# Patient Record
Sex: FEMALE | Race: WHITE | Employment: FULL TIME | URBAN - METROPOLITAN AREA
[De-identification: names, ages, dates, MRNs, and addresses within clinical notes are randomized per-mention and may not be internally consistent; named-entity substitution may affect disease eponyms.]

---

## 2018-09-10 ENCOUNTER — HOSPITAL ENCOUNTER (OUTPATIENT)
Dept: MAMMOGRAPHY | Age: 43
Discharge: HOME | End: 2018-09-10
Attending: FAMILY MEDICINE
Payer: COMMERCIAL

## 2018-09-10 DIAGNOSIS — R92.8 ABNORMAL MAMMOGRAM OF LEFT BREAST: ICD-10-CM

## 2018-09-10 DIAGNOSIS — Z12.39 SCREENING BREAST EXAMINATION: ICD-10-CM

## 2018-09-10 PROCEDURE — 77063 BREAST TOMOSYNTHESIS BI: CPT

## 2018-09-10 PROCEDURE — 76642 ULTRASOUND BREAST LIMITED: CPT

## 2018-10-08 ENCOUNTER — OFFICE VISIT (OUTPATIENT)
Dept: SURGERY | Age: 43
End: 2018-10-08

## 2018-10-08 VITALS
DIASTOLIC BLOOD PRESSURE: 84 MMHG | BODY MASS INDEX: 29.59 KG/M2 | HEIGHT: 63 IN | WEIGHT: 167 LBS | SYSTOLIC BLOOD PRESSURE: 137 MMHG | HEART RATE: 86 BPM

## 2018-10-08 DIAGNOSIS — N60.12 FIBROCYSTIC DISEASE OF LEFT BREAST: ICD-10-CM

## 2018-10-08 DIAGNOSIS — Z80.3 FAMILY HISTORY OF BREAST CANCER: Primary | ICD-10-CM

## 2018-10-08 NOTE — PATIENT INSTRUCTIONS
Breast Lumps: Care Instructions  Your Care Instructions  Breast lumps are common, especially in women between ages 27 and 48. Many women's breasts feel lumpy and tender before their menstrual period. Women also may have lumps when they are breastfeeding. Breast lumps may go away after menopause. All new breast lumps in women after menopause should be checked by a doctor. Although lumps may be normal for you, it is important to have your doctor check any lump or thickness that is not like the rest of your breast to make sure it is not cancer. A lump may be larger, harder, or different from the rest of your breast tissue. Follow-up care is a key part of your treatment and safety. Be sure to make and go to all appointments, and call your doctor if you are having problems. It's also a good idea to know your test results and keep a list of the medicines you take. How can you care for yourself at home? · Make an appointment to have a mammogram and other follow-up visits as recommended by your doctor. When should you call for help? Watch closely for changes in your health, and be sure to contact your doctor if:    · You do not get better as expected.     · Your breast has changed.     · You have pain in your breast.     · You have a discharge from your nipple.     · A breast lump changes or does not go away. Where can you learn more? Go to http://luis-yadiel.info/. Enter I474 in the search box to learn more about \"Breast Lumps: Care Instructions. \"  Current as of: May 15, 2018  Content Version: 11.8  © 9874-0811 Healthwise, Incorporated. Care instructions adapted under license by [employer] (which disclaims liability or warranty for this information). If you have questions about a medical condition or this instruction, always ask your healthcare professional. [address] any warranty or liability for your use of this information.

## 2018-10-08 NOTE — MR AVS SNAPSHOT
[address] [facility] G1 1400 [address] [place] [address] 
[phone] Patient: Alexandra Dacosta MRN: [id number] HAILE:8[birth year]3430 Visit Information Date & Time Provider Department Dept. Phone Encounter #  
 10/8/2018 10:00  MD [address] at [facility] [phone] Upcoming Health Maintenance Date Due Pneumococcal 19-64 Medium Risk (1 of 1 - PPSV23) 5/12/1993 DTaP/Tdap/Td series (1 - Tdap) 5/12/1995 PAP AKA CERVICAL CYTOLOGY 5/12/1995 Influenza Age 5 to Adult 8/1/2018 Allergies as of 10/8/2018  Review Complete On: 8/10/2015 By: Abebe Johnson RN Severity Noted Reaction Type Reactions Topamax [Topiramate]  03/25/2014    Vertigo Current Immunizations  Never Reviewed No immunizations on file. Not reviewed this visit Vitals BP Pulse Height(growth percentile) Weight(growth percentile) BMI OB Status 137/84 86 5' 3\" (1.6 m) 167 lb (75.8 kg) 29.58 kg/m2 Perimenopausal  
 Smoking Status Current Every Day Smoker BMI and BSA Data Body Mass Index Body Surface Area  
 29.58 kg/m 2 1.84 m 2 Your Updated Medication List  
  
   
This list is accurate as of 10/8/18 10:46 AM.  Always use your most recent med list. ADVIL PO Take  by mouth as needed. ATIVAN 0.5 mg tablet Generic drug:  LORazepam  
Take 0.25 mg by mouth every eight (8) hours as needed for Anxiety. synthroid 50 mcg tablet Generic drug:  levothyroxine Take 50 mcg by mouth Daily (before breakfast). ZOLOFT 25 mg tablet Generic drug:  sertraline Take 25 mg by mouth daily. Patient Instructions Breast Lumps: Care Instructions Your Care Instructions Breast lumps are common, especially in women between ages 27 and 48. Many women's breasts feel lumpy and tender before their menstrual period.  Women also may have lumps when they are breastfeeding. Breast lumps may go away after menopause. All new breast lumps in women after menopause should be checked by a doctor. Although lumps may be normal for you, it is important to have your doctor check any lump or thickness that is not like the rest of your breast to make sure it is not cancer. A lump may be larger, harder, or different from the rest of your breast tissue. Follow-up care is a key part of your treatment and safety. Be sure to make and go to all appointments, and call your doctor if you are having problems. It's also a good idea to know your test results and keep a list of the medicines you take. How can you care for yourself at home? · Make an appointment to have a mammogram and other follow-up visits as recommended by your doctor. When should you call for help? Watch closely for changes in your health, and be sure to contact your doctor if: 
  · You do not get better as expected.  
  · Your breast has changed.  
  · You have pain in your breast.  
  · You have a discharge from your nipple.  
  · A breast lump changes or does not go away. Where can you learn more? Go to http://luis-yadiel.info/. Enter R848 in the search box to learn more about \"Breast Lumps: Care Instructions. \" Current as of: May 15, 2018 Content Version: 11.8 © 6407-2205 Healthwise, Incorporated. Care instructions adapted under license by [employer] (which disclaims liability or warranty for this information). If you have questions about a medical condition or this instruction, always ask your healthcare professional. [place] [address] any warranty or liability for your use of this information. Introducing [facility]! Dear Odessa Reyna: Thank you for requesting a [employer] account. Our records indicate that you already have an active [employer] account.   You can access your account anytime at [email or web address] Did you know that you can access your hospital and ER discharge instructions at any time in [employer]? You can also review all of your test results from your hospital stay or ER visit. Additional Information If you have questions, please visit the Frequently Asked Questions section of the [employer] website at [email or web address] Remember, [employer] is NOT to be used for urgent needs. For medical emergencies, dial 911. Now available from your iPhone and Android! Please provide this summary of care documentation to your next provider. Your primary care clinician is listed as Bj Reece. If you have any questions after today's visit, please call [phone].

## 2018-10-08 NOTE — LETTER
10/10/2018 10:58 AM 
 
Patient:  Maryann Reyna YOB: 1975 Date of Visit: 10/8/2018 Dear Penny Flores MD 
[address] [place], [place] [address] [phone] [phone] VIA Facsimile: [phone] 
 : Thank you for referring Ms. Denia Campbell to me for evaluation/treatment. Below are the relevant portions of my assessment and plan of care. HISTORY OF PRESENT ILLNESS Maryann Reyna is a 39 y.o. female. HPI 
NEW patient here today referred for consultation at the request of Dr. Lisbet Benavides for LEFT breast lump and high risk. She has palpated painful lump in LEFT breast 7:00 [facility]. Lump continues to be painful. She had biopsy of this lump about 4 years ago, but was found to be a cyst and filled back up immediately. She also reports having RIGHT breast lumps, but she is unable to palpate. Denies any nipple discharge/retraction or skin changes. FH includes- 
Mother diagnosed with lung cancer, . Maternal grandmother diagnosed with uterine cancer, . Paternal grandmother diagnosed with breast cancer, . Recent imaging-  
3D bilateral screening mammogram on 9/10/18- BIRADS 0 LEFT breast ultrasound on 9/10/18- BIRADS 2 cysts in left breast 1:00 She gets breast MRIs due to her high risk. Previously calculated tyrer-melba is 21.5% Past Medical History:  
Diagnosis Date  Lupus  Musculoskeletal disorder  Thyroid disease Past Surgical History:  
Procedure Laterality Date  HX HEENT    
 jaw surgery  HX LAP CHOLECYSTECTOMY  2/24/15  
 by Dr. Miryam Yates  [facility] BX BREAST LT 1ST LESION W/CLIP AND SPECIMEN Left   
 benign Social History Social History  Marital status:  Spouse name: N/A  
 Number of children: N/A  
 Years of education: N/A Occupational History  Not on file. Social History Main Topics  Smoking status: Current Every Day Smoker  Smokeless tobacco: Current User  Alcohol use No  
 Drug use: Not on file  Sexual activity: Not on file Other Topics Concern  Not on file Social History Narrative OB History Obstetric Comments Menarche 5, LMP current, # of children 1, age of 4st delivery 27, Hysterectomy/oophorectomy no/no, Breast bx yes, left 4 years ago, history of breast feeding no, BCP yes, Hormone therapy no Current Outpatient Prescriptions:  
  sertraline (ZOLOFT) 25 mg tablet, Take 25 mg by mouth daily. , Disp: , Rfl:  
  levothyroxine (SYNTHROID) 50 mcg tablet, Take 50 mcg by mouth Daily (before breakfast). , Disp: , Rfl:  
  LORazepam (ATIVAN) 0.5 mg tablet, Take 0.25 mg by mouth every eight (8) hours as needed for Anxiety. , Disp: , Rfl:  
  IBUPROFEN (ADVIL PO), Take  by mouth as needed. , Disp: , Rfl:  
Allergies Allergen Reactions  Topamax [Topiramate] Vertigo Review of Systems Constitutional: Positive for malaise/fatigue. Eyes: Negative. Respiratory: Negative. Cardiovascular: Positive for chest pain. Gastrointestinal: Positive for abdominal pain and diarrhea. Genitourinary: Negative. Musculoskeletal: Positive for back pain and joint pain. Skin: Negative. Neurological: Positive for headaches. Endo/Heme/Allergies: Negative. Psychiatric/Behavioral: Negative. Physical Exam  
Constitutional: She is oriented to person, place, and time. She appears well-developed and well-nourished. HENT:  
Head: Normocephalic. Eyes: EOM are normal.  
Neck: Neck supple. Cardiovascular: Intact distal pulses. Pulmonary/Chest: Effort normal. Right breast exhibits no inverted nipple, no mass, no nipple discharge, no skin change and no tenderness. Left breast exhibits mass (mass at 7:00 at [facility]. cysts seen on us. ) and tenderness. Left breast exhibits no inverted nipple, no nipple discharge and no skin change. Breasts are symmetrical.  
Abdominal: Soft. Musculoskeletal: Normal range of motion. Lymphadenopathy:  
  She has no cervical adenopathy. She has no axillary adenopathy. Neurological: She is alert and oriented to person, place, and time. Skin: Skin is warm and dry. Psychiatric: She has a normal mood and affect. Nursing note and vitals reviewed. ASSESSMENT and PLAN 
  ICD-10-CM ICD-9-CM 1. Family history of breast cancer Z80.3 V16.3 2. Fibrocystic disease of left breast N60.12 610.1 38 yo female high risk with chronic left breast lump She has focal pain in this area and has had aspirations in the past.  
She wishes to have this removed. I think this is reasonable but needs mri first for high risk. She is fine with the plan. If you have questions, please do not hesitate to call me. I look forward to following Ms. Olivo along with you. Sincerely, Mak Duncan MD

## 2018-10-08 NOTE — PROGRESS NOTES
HISTORY OF PRESENT ILLNESS  Chaim Parker is a 39 y.o. female. HPI  NEW patient here today referred for consultation at the request of Dr. Anushka Avila for LEFT breast lump and high risk. She has palpated painful lump in LEFT breast 7:00 at [facility] for 10 years. Lump continues to be painful. She had biopsy of this lump about 4 years ago, but was found to be a cyst and filled back up immediately. She also reports having RIGHT breast lumps, but she is unable to palpate. Denies any nipple discharge/retraction or skin changes. FH includes-  Mother diagnosed with lung cancer, . Maternal grandmother diagnosed with uterine cancer, . Paternal grandmother diagnosed with breast cancer, . Recent imaging-   3D bilateral screening mammogram on 9/10/18- BIRADS 0  LEFT breast ultrasound on 9/10/18- BIRADS 2 cysts in left breast 1:00    She gets breast MRIs due to her high risk. Previously calculated tyrer-melba is 21.5%     Past Medical History:   Diagnosis Date    Lupus     Musculoskeletal disorder     Thyroid disease      Past Surgical History:   Procedure Laterality Date    HX HEENT      jaw surgery    HX LAP CHOLECYSTECTOMY  2/24/15    by Dr. Nina Reis Left     benign     Social History     Social History    Marital status:      Spouse name: N/A    Number of children: N/A    Years of education: N/A     Occupational History    Not on file.      Social History Main Topics    Smoking status: Current Every Day Smoker    Smokeless tobacco: Current User    Alcohol use No    Drug use: Not on file    Sexual activity: Not on file     Other Topics Concern    Not on file     Social History Narrative     OB History     Obstetric Comments    Menarche 5, LMP current, # of children 1, age of 4st delivery 27, Hysterectomy/oophorectomy no/no, Breast bx yes, left 4 years ago, history of breast feeding no, BCP yes, Hormone therapy no            Current Outpatient Prescriptions:     sertraline (ZOLOFT) 25 mg tablet, Take 25 mg by mouth daily. , Disp: , Rfl:     levothyroxine (SYNTHROID) 50 mcg tablet, Take 50 mcg by mouth Daily (before breakfast). , Disp: , Rfl:     LORazepam (ATIVAN) 0.5 mg tablet, Take 0.25 mg by mouth every eight (8) hours as needed for Anxiety. , Disp: , Rfl:     IBUPROFEN (ADVIL PO), Take  by mouth as needed. , Disp: , Rfl:   Allergies   Allergen Reactions    Topamax [Topiramate] Vertigo           Review of Systems   Constitutional: Positive for malaise/fatigue. Eyes: Negative. Respiratory: Negative. Cardiovascular: Positive for chest pain. Gastrointestinal: Positive for abdominal pain and diarrhea. Genitourinary: Negative. Musculoskeletal: Positive for back pain and joint pain. Skin: Negative. Neurological: Positive for headaches. Endo/Heme/Allergies: Negative. Psychiatric/Behavioral: Negative. Physical Exam   Constitutional: She is oriented to person, place, and time. She appears well-developed and well-nourished. HENT:   Head: Normocephalic. Eyes: EOM are normal.   Neck: Neck supple. Cardiovascular: Intact distal pulses. Pulmonary/Chest: Effort normal. Right breast exhibits no inverted nipple, no mass, no nipple discharge, no skin change and no tenderness. Left breast exhibits mass (mass at 7:00 at [facility]. cysts seen on us. ) and tenderness. Left breast exhibits no inverted nipple, no nipple discharge and no skin change. Breasts are symmetrical.   Abdominal: Soft. Musculoskeletal: Normal range of motion. Lymphadenopathy:     She has no cervical adenopathy. She has no axillary adenopathy. Neurological: She is alert and oriented to person, place, and time. Skin: Skin is warm and dry. Psychiatric: She has a normal mood and affect. Nursing note and vitals reviewed. ASSESSMENT and PLAN    ICD-10-CM ICD-9-CM    1. Family history of breast cancer Z80.3 V16.3    2. Fibrocystic disease of left breast N60.12 610.1      40 yo female high risk with chronic left breast lump  She has focal pain in this area and has had aspirations in the past.   She wishes to have this removed. I think this is reasonable but needs mri first for high risk. She is fine with the plan.

## 2018-10-10 PROBLEM — Z80.3 FAMILY HISTORY OF BREAST CANCER: Status: ACTIVE | Noted: 2018-10-10

## 2018-10-10 PROBLEM — N60.19 FIBROCYSTIC BREAST: Status: ACTIVE | Noted: 2018-10-10

## 2018-10-10 NOTE — COMMUNICATION BODY
HISTORY OF PRESENT ILLNESS  Trino Colón is a 39 y.o. female. HPI  NEW patient here today referred for consultation at the request of Dr. Trey Rae for LEFT breast lump and high risk. She has palpated painful lump in LEFT breast 7:00 at [facility] for 10 years. Lump continues to be painful. She had biopsy of this lump about 4 years ago, but was found to be a cyst and filled back up immediately. She also reports having RIGHT breast lumps, but she is unable to palpate. Denies any nipple discharge/retraction or skin changes. FH includes-  Mother diagnosed with lung cancer, . Maternal grandmother diagnosed with uterine cancer, . Paternal grandmother diagnosed with breast cancer, . Recent imaging-   3D bilateral screening mammogram on 9/10/18- BIRADS 0  LEFT breast ultrasound on 9/10/18- BIRADS 2 cysts in left breast 1:00    She gets breast MRIs due to her high risk. Previously calculated tyrer-melba is 21.5%     Past Medical History:   Diagnosis Date    Lupus     Musculoskeletal disorder     Thyroid disease      Past Surgical History:   Procedure Laterality Date    HX HEENT      jaw surgery    HX LAP CHOLECYSTECTOMY  2/24/15    by Dr. Gt Keys Left     benign     Social History     Social History    Marital status:      Spouse name: N/A    Number of children: N/A    Years of education: N/A     Occupational History    Not on file.      Social History Main Topics    Smoking status: Current Every Day Smoker    Smokeless tobacco: Current User    Alcohol use No    Drug use: Not on file    Sexual activity: Not on file     Other Topics Concern    Not on file     Social History Narrative     OB History     Obstetric Comments    Menarche 5, LMP current, # of children 1, age of 4st delivery 27, Hysterectomy/oophorectomy no/no, Breast bx yes, left 4 years ago, history of breast feeding no, BCP yes, Hormone therapy no            Current Outpatient Prescriptions:     sertraline (ZOLOFT) 25 mg tablet, Take 25 mg by mouth daily. , Disp: , Rfl:     levothyroxine (SYNTHROID) 50 mcg tablet, Take 50 mcg by mouth Daily (before breakfast). , Disp: , Rfl:     LORazepam (ATIVAN) 0.5 mg tablet, Take 0.25 mg by mouth every eight (8) hours as needed for Anxiety. , Disp: , Rfl:     IBUPROFEN (ADVIL PO), Take  by mouth as needed. , Disp: , Rfl:   Allergies   Allergen Reactions    Topamax [Topiramate] Vertigo           Review of Systems   Constitutional: Positive for malaise/fatigue. Eyes: Negative. Respiratory: Negative. Cardiovascular: Positive for chest pain. Gastrointestinal: Positive for abdominal pain and diarrhea. Genitourinary: Negative. Musculoskeletal: Positive for back pain and joint pain. Skin: Negative. Neurological: Positive for headaches. Endo/Heme/Allergies: Negative. Psychiatric/Behavioral: Negative. Physical Exam   Constitutional: She is oriented to person, place, and time. She appears well-developed and well-nourished. HENT:   Head: Normocephalic. Eyes: EOM are normal.   Neck: Neck supple. Cardiovascular: Intact distal pulses. Pulmonary/Chest: Effort normal. Right breast exhibits no inverted nipple, no mass, no nipple discharge, no skin change and no tenderness. Left breast exhibits mass (mass at 7:00 at [facility]. cysts seen on us. ) and tenderness. Left breast exhibits no inverted nipple, no nipple discharge and no skin change. Breasts are symmetrical.   Abdominal: Soft. Musculoskeletal: Normal range of motion. Lymphadenopathy:     She has no cervical adenopathy. She has no axillary adenopathy. Neurological: She is alert and oriented to person, place, and time. Skin: Skin is warm and dry. Psychiatric: She has a normal mood and affect. Nursing note and vitals reviewed. ASSESSMENT and PLAN    ICD-10-CM ICD-9-CM    1. Family history of breast cancer Z80.3 V16.3    2. Fibrocystic disease of left breast N60.12 610.1      40 yo female high risk with chronic left breast lump  She has focal pain in this area and has had aspirations in the past.   She wishes to have this removed. I think this is reasonable but needs mri first for high risk. She is fine with the plan.

## 2018-10-22 ENCOUNTER — TELEPHONE (OUTPATIENT)
Dept: SURGERY | Age: 43
End: 2018-10-22

## 2018-10-22 NOTE — TELEPHONE ENCOUNTER
Patient left a voicemail, just to make us aware that she received message/instructions on calling [facility] on Day 1 of her menstrual cycle so that she can schedule breast MRI accordingly.

## 2018-11-05 ENCOUNTER — TELEPHONE (OUTPATIENT)
Dept: SURGERY | Age: 43
End: 2018-11-05

## 2018-11-05 NOTE — TELEPHONE ENCOUNTER
Patient called to let us know her breast MRI is scheduled at [facility] on 11/12 at 11:45am. I called her back to let her know that Dr. Eric Storm will call her with the results and will make the surgical plan at this time. She is appreciative.

## 2018-11-12 ENCOUNTER — HOSPITAL ENCOUNTER (OUTPATIENT)
Dept: MRI IMAGING | Age: 43
Discharge: HOME | End: 2018-11-12
Attending: SURGERY
Payer: COMMERCIAL

## 2018-11-12 VITALS — BODY MASS INDEX: 28.17 KG/M2 | WEIGHT: 159 LBS

## 2018-11-12 DIAGNOSIS — N60.12 FIBROCYSTIC DISEASE OF LEFT BREAST: ICD-10-CM

## 2018-11-12 DIAGNOSIS — Z80.3 FAMILY HISTORY OF BREAST CANCER: ICD-10-CM

## 2018-11-12 PROCEDURE — A9585 GADOBUTROL INJECTION: HCPCS | Performed by: SURGERY

## 2018-11-12 PROCEDURE — 0159T MRI BREAST BI W WO CONT: CPT

## 2018-11-12 PROCEDURE — 74011250636 HC RX REV CODE- 250/636: Performed by: SURGERY

## 2018-11-12 RX ADMIN — GADOBUTROL 7 ML: 604.72 INJECTION INTRAVENOUS at 12:29

## 2018-11-13 ENCOUNTER — TELEPHONE (OUTPATIENT)
Dept: SURGERY | Age: 43
End: 2018-11-13

## 2018-11-13 NOTE — TELEPHONE ENCOUNTER
LUPE,  High risk patient   Mri normal  Can you let her know? Thanks    900am - spoke with patient and reviewed MRI results. Would like to have left breast mass excised before the end of the year. Had previously discussed excisional bx with Dr. Mike Crenshaw. Reviewed that it would be same day surgery and likely MAC anesthesia. She will await a call from surgical schedulers.

## 2018-11-19 ENCOUNTER — DOCUMENTATION ONLY (OUTPATIENT)
Dept: SURGERY | Age: 43
End: 2018-11-19

## 2018-11-19 NOTE — PROGRESS NOTES
Left a voice message for the patient to call and schedule her Left breast excisional biopsy . I let her know we were scheduling into December. Left my name and tele #.

## 2018-12-27 ENCOUNTER — DOCUMENTATION ONLY (OUTPATIENT)
Dept: SURGERY | Age: 43
End: 2018-12-27

## 2018-12-27 NOTE — PROGRESS NOTES
Left a voice message for patient to call when she is ready to be scheduled, in the new year.    Left excisional biopsy

## 2019-01-04 ENCOUNTER — DOCUMENTATION ONLY (OUTPATIENT)
Dept: SURGERY | Age: 44
End: 2019-01-04

## 2019-01-04 NOTE — PROGRESS NOTES
Left patient a voice mail to call our office to get her surgery for left breast excisional bx with ultrasound scheduled. She was supposed to call and set up for after the first of the year.  I explained we are scheduling for February, left our phone number, and important to schedule

## 2019-02-19 ENCOUNTER — DOCUMENTATION ONLY (OUTPATIENT)
Dept: SURGERY | Age: 44
End: 2019-02-19

## 2019-02-19 DIAGNOSIS — N63.20 LEFT BREAST LUMP: Primary | ICD-10-CM

## 2019-02-19 NOTE — PROGRESS NOTES
SCHEDULED SURGERY AT [facility] ON 3-19-19 AT 9:15 AM; PATIENT WILL ARRIVE AT 7:15 AM AND CHECK IN AT PATIENT ACCESS    POST OP AT [facility] IN [place] G[facility] ON 3-25-19 AT 1:45 PM    PATIENT HAS BEEN CALLED AND GIVEN INSTRUCTIONS

## 2019-03-18 ENCOUNTER — DOCUMENTATION ONLY (OUTPATIENT)
Dept: SURGERY | Age: 44
End: 2019-03-18

## 2019-03-18 ENCOUNTER — ANESTHESIA EVENT (OUTPATIENT)
Dept: MEDSURG UNIT | Age: 44
End: 2019-03-18
Payer: COMMERCIAL

## 2019-03-18 NOTE — PROGRESS NOTES
[facility] I went over her surgery instructions again with her. She asked why she had to be there 2 hours before and I told her that was protocol. She was fine. Thanked me for calling back.

## 2019-03-19 ENCOUNTER — HOSPITAL ENCOUNTER (OUTPATIENT)
Age: 44
Setting detail: OUTPATIENT SURGERY
Discharge: HOME | End: 2019-03-19
Attending: SURGERY | Admitting: SURGERY
Payer: COMMERCIAL

## 2019-03-19 ENCOUNTER — ANESTHESIA (OUTPATIENT)
Dept: MEDSURG UNIT | Age: 44
End: 2019-03-19
Payer: COMMERCIAL

## 2019-03-19 ENCOUNTER — TELEPHONE (OUTPATIENT)
Dept: SURGERY | Age: 44
End: 2019-03-19

## 2019-03-19 VITALS
WEIGHT: 159 LBS | SYSTOLIC BLOOD PRESSURE: 114 MMHG | HEIGHT: 63 IN | TEMPERATURE: 97.9 F | OXYGEN SATURATION: 98 % | HEART RATE: 70 BPM | BODY MASS INDEX: 28.17 KG/M2 | DIASTOLIC BLOOD PRESSURE: 67 MMHG | RESPIRATION RATE: 20 BRPM

## 2019-03-19 DIAGNOSIS — N63.20 LEFT BREAST LUMP: ICD-10-CM

## 2019-03-19 LAB
ATRIAL RATE: 73 BPM
CALCULATED P AXIS, ECG09: 73 DEGREES
CALCULATED R AXIS, ECG10: 46 DEGREES
CALCULATED T AXIS, ECG11: 36 DEGREES
DIAGNOSIS, 93000: NORMAL
HCG UR QL: NEGATIVE
P-R INTERVAL, ECG05: 148 MS
Q-T INTERVAL, ECG07: 410 MS
QRS DURATION, ECG06: 88 MS
QTC CALCULATION (BEZET), ECG08: 451 MS
VENTRICULAR RATE, ECG03: 73 BPM

## 2019-03-19 PROCEDURE — 76030000000 HC AMB SURG OR TIME 0.5 TO 1: Performed by: SURGERY

## 2019-03-19 PROCEDURE — 77030011640 HC PAD GRND REM COVD -A: Performed by: SURGERY

## 2019-03-19 PROCEDURE — 77030011267 HC ELECTRD BLD COVD -A: Performed by: SURGERY

## 2019-03-19 PROCEDURE — 74011250636 HC RX REV CODE- 250/636

## 2019-03-19 PROCEDURE — 74011000250 HC RX REV CODE- 250: Performed by: SURGERY

## 2019-03-19 PROCEDURE — 76210000034 HC AMBSU PH I REC 0.5 TO 1 HR: Performed by: SURGERY

## 2019-03-19 PROCEDURE — 74011250636 HC RX REV CODE- 250/636: Performed by: ANESTHESIOLOGY

## 2019-03-19 PROCEDURE — 93005 ELECTROCARDIOGRAM TRACING: CPT

## 2019-03-19 PROCEDURE — 77030032490 HC SLV COMPR SCD KNE COVD -B: Performed by: SURGERY

## 2019-03-19 PROCEDURE — 81025 URINE PREGNANCY TEST: CPT

## 2019-03-19 PROCEDURE — 77030020782 HC GWN BAIR PAWS FLX 3M -B

## 2019-03-19 PROCEDURE — 77030010509 HC AIRWY LMA MSK TELE -A: Performed by: ANESTHESIOLOGY

## 2019-03-19 PROCEDURE — 88307 TISSUE EXAM BY PATHOLOGIST: CPT

## 2019-03-19 PROCEDURE — 76210000050 HC AMBSU PH II REC 0.5 TO 1 HR: Performed by: SURGERY

## 2019-03-19 PROCEDURE — 77030039266 HC ADH SKN EXOFIN S2SG -A: Performed by: SURGERY

## 2019-03-19 PROCEDURE — 77030018836 HC SOL IRR NACL ICUM -A: Performed by: SURGERY

## 2019-03-19 PROCEDURE — 77030031139 HC SUT VCRL2 J&J -A: Performed by: SURGERY

## 2019-03-19 PROCEDURE — 77030002996 HC SUT SLK J&J -A: Performed by: SURGERY

## 2019-03-19 PROCEDURE — 77030002933 HC SUT MCRYL J&J -A: Performed by: SURGERY

## 2019-03-19 PROCEDURE — 76060000061 HC AMB SURG ANES 0.5 TO 1 HR: Performed by: SURGERY

## 2019-03-19 RX ORDER — SODIUM CHLORIDE 0.9 % (FLUSH) 0.9 %
5-40 SYRINGE (ML) INJECTION EVERY 8 HOURS
Status: DISCONTINUED | OUTPATIENT
Start: 2019-03-19 | End: 2019-03-19 | Stop reason: HOSPADM

## 2019-03-19 RX ORDER — OXYCODONE AND ACETAMINOPHEN 5; 325 MG/1; MG/1
1 TABLET ORAL
Qty: 30 TAB | Refills: 0 | Status: SHIPPED | OUTPATIENT
Start: 2019-03-19 | End: 2019-03-22

## 2019-03-19 RX ORDER — ONDANSETRON 2 MG/ML
INJECTION INTRAMUSCULAR; INTRAVENOUS AS NEEDED
Status: DISCONTINUED | OUTPATIENT
Start: 2019-03-19 | End: 2019-03-19 | Stop reason: HOSPADM

## 2019-03-19 RX ORDER — SODIUM CHLORIDE, SODIUM LACTATE, POTASSIUM CHLORIDE, CALCIUM CHLORIDE 600; 310; 30; 20 MG/100ML; MG/100ML; MG/100ML; MG/100ML
125 INJECTION, SOLUTION INTRAVENOUS CONTINUOUS
Status: DISCONTINUED | OUTPATIENT
Start: 2019-03-19 | End: 2019-03-19 | Stop reason: HOSPADM

## 2019-03-19 RX ORDER — LIDOCAINE HYDROCHLORIDE 20 MG/ML
INJECTION, SOLUTION EPIDURAL; INFILTRATION; INTRACAUDAL; PERINEURAL AS NEEDED
Status: DISCONTINUED | OUTPATIENT
Start: 2019-03-19 | End: 2019-03-19 | Stop reason: HOSPADM

## 2019-03-19 RX ORDER — MIDAZOLAM HYDROCHLORIDE 1 MG/ML
INJECTION, SOLUTION INTRAMUSCULAR; INTRAVENOUS AS NEEDED
Status: DISCONTINUED | OUTPATIENT
Start: 2019-03-19 | End: 2019-03-19 | Stop reason: HOSPADM

## 2019-03-19 RX ORDER — FENTANYL CITRATE 50 UG/ML
25 INJECTION, SOLUTION INTRAMUSCULAR; INTRAVENOUS
Status: DISCONTINUED | OUTPATIENT
Start: 2019-03-19 | End: 2019-03-19 | Stop reason: HOSPADM

## 2019-03-19 RX ORDER — MORPHINE SULFATE 10 MG/ML
2 INJECTION, SOLUTION INTRAMUSCULAR; INTRAVENOUS
Status: DISCONTINUED | OUTPATIENT
Start: 2019-03-19 | End: 2019-03-19 | Stop reason: HOSPADM

## 2019-03-19 RX ORDER — ONDANSETRON 2 MG/ML
4 INJECTION INTRAMUSCULAR; INTRAVENOUS AS NEEDED
Status: DISCONTINUED | OUTPATIENT
Start: 2019-03-19 | End: 2019-03-19 | Stop reason: HOSPADM

## 2019-03-19 RX ORDER — HYDROMORPHONE HYDROCHLORIDE 1 MG/ML
0.2 INJECTION, SOLUTION INTRAMUSCULAR; INTRAVENOUS; SUBCUTANEOUS
Status: DISCONTINUED | OUTPATIENT
Start: 2019-03-19 | End: 2019-03-19 | Stop reason: HOSPADM

## 2019-03-19 RX ORDER — SODIUM CHLORIDE 9 MG/ML
25 INJECTION, SOLUTION INTRAVENOUS CONTINUOUS
Status: DISCONTINUED | OUTPATIENT
Start: 2019-03-19 | End: 2019-03-19 | Stop reason: HOSPADM

## 2019-03-19 RX ORDER — SODIUM CHLORIDE, SODIUM LACTATE, POTASSIUM CHLORIDE, CALCIUM CHLORIDE 600; 310; 30; 20 MG/100ML; MG/100ML; MG/100ML; MG/100ML
INJECTION, SOLUTION INTRAVENOUS
Status: DISCONTINUED | OUTPATIENT
Start: 2019-03-19 | End: 2019-03-19 | Stop reason: HOSPADM

## 2019-03-19 RX ORDER — MIDAZOLAM HYDROCHLORIDE 1 MG/ML
1 INJECTION, SOLUTION INTRAMUSCULAR; INTRAVENOUS AS NEEDED
Status: DISCONTINUED | OUTPATIENT
Start: 2019-03-19 | End: 2019-03-19 | Stop reason: HOSPADM

## 2019-03-19 RX ORDER — LIDOCAINE HYDROCHLORIDE 10 MG/ML
0.1 INJECTION, SOLUTION EPIDURAL; INFILTRATION; INTRACAUDAL; PERINEURAL AS NEEDED
Status: DISCONTINUED | OUTPATIENT
Start: 2019-03-19 | End: 2019-03-19 | Stop reason: HOSPADM

## 2019-03-19 RX ORDER — ACETAMINOPHEN 10 MG/ML
INJECTION, SOLUTION INTRAVENOUS AS NEEDED
Status: DISCONTINUED | OUTPATIENT
Start: 2019-03-19 | End: 2019-03-19 | Stop reason: HOSPADM

## 2019-03-19 RX ORDER — PROPOFOL 10 MG/ML
INJECTION, EMULSION INTRAVENOUS AS NEEDED
Status: DISCONTINUED | OUTPATIENT
Start: 2019-03-19 | End: 2019-03-19 | Stop reason: HOSPADM

## 2019-03-19 RX ORDER — OXYCODONE AND ACETAMINOPHEN 5; 325 MG/1; MG/1
1 TABLET ORAL AS NEEDED
Status: DISCONTINUED | OUTPATIENT
Start: 2019-03-19 | End: 2019-03-19 | Stop reason: HOSPADM

## 2019-03-19 RX ORDER — FENTANYL CITRATE 50 UG/ML
50 INJECTION, SOLUTION INTRAMUSCULAR; INTRAVENOUS AS NEEDED
Status: DISCONTINUED | OUTPATIENT
Start: 2019-03-19 | End: 2019-03-19 | Stop reason: HOSPADM

## 2019-03-19 RX ORDER — DEXAMETHASONE SODIUM PHOSPHATE 4 MG/ML
INJECTION, SOLUTION INTRA-ARTICULAR; INTRALESIONAL; INTRAMUSCULAR; INTRAVENOUS; SOFT TISSUE AS NEEDED
Status: DISCONTINUED | OUTPATIENT
Start: 2019-03-19 | End: 2019-03-19 | Stop reason: HOSPADM

## 2019-03-19 RX ORDER — SODIUM CHLORIDE 0.9 % (FLUSH) 0.9 %
5-40 SYRINGE (ML) INJECTION AS NEEDED
Status: DISCONTINUED | OUTPATIENT
Start: 2019-03-19 | End: 2019-03-19 | Stop reason: HOSPADM

## 2019-03-19 RX ORDER — DIPHENHYDRAMINE HYDROCHLORIDE 50 MG/ML
12.5 INJECTION, SOLUTION INTRAMUSCULAR; INTRAVENOUS AS NEEDED
Status: DISCONTINUED | OUTPATIENT
Start: 2019-03-19 | End: 2019-03-19 | Stop reason: HOSPADM

## 2019-03-19 RX ORDER — FENTANYL CITRATE 50 UG/ML
INJECTION, SOLUTION INTRAMUSCULAR; INTRAVENOUS AS NEEDED
Status: DISCONTINUED | OUTPATIENT
Start: 2019-03-19 | End: 2019-03-19 | Stop reason: HOSPADM

## 2019-03-19 RX ORDER — MIDAZOLAM HYDROCHLORIDE 1 MG/ML
0.5 INJECTION, SOLUTION INTRAMUSCULAR; INTRAVENOUS
Status: DISCONTINUED | OUTPATIENT
Start: 2019-03-19 | End: 2019-03-19 | Stop reason: HOSPADM

## 2019-03-19 RX ADMIN — MIDAZOLAM HYDROCHLORIDE 2 MG: 1 INJECTION, SOLUTION INTRAMUSCULAR; INTRAVENOUS at 09:21

## 2019-03-19 RX ADMIN — LIDOCAINE HYDROCHLORIDE 100 MG: 20 INJECTION, SOLUTION EPIDURAL; INFILTRATION; INTRACAUDAL; PERINEURAL at 09:25

## 2019-03-19 RX ADMIN — ACETAMINOPHEN 1000 MG: 10 INJECTION, SOLUTION INTRAVENOUS at 09:34

## 2019-03-19 RX ADMIN — PROPOFOL 160 MG: 10 INJECTION, EMULSION INTRAVENOUS at 09:25

## 2019-03-19 RX ADMIN — FENTANYL CITRATE 50 MCG: 50 INJECTION, SOLUTION INTRAMUSCULAR; INTRAVENOUS at 09:30

## 2019-03-19 RX ADMIN — SODIUM CHLORIDE, SODIUM LACTATE, POTASSIUM CHLORIDE, AND CALCIUM CHLORIDE 125 ML/HR: 600; 310; 30; 20 INJECTION, SOLUTION INTRAVENOUS at 09:17

## 2019-03-19 RX ADMIN — ONDANSETRON 4 MG: 2 INJECTION INTRAMUSCULAR; INTRAVENOUS at 09:59

## 2019-03-19 RX ADMIN — FENTANYL CITRATE 50 MCG: 50 INJECTION, SOLUTION INTRAMUSCULAR; INTRAVENOUS at 09:25

## 2019-03-19 RX ADMIN — DEXAMETHASONE SODIUM PHOSPHATE 8 MG: 4 INJECTION, SOLUTION INTRA-ARTICULAR; INTRALESIONAL; INTRAMUSCULAR; INTRAVENOUS; SOFT TISSUE at 09:30

## 2019-03-19 RX ADMIN — SODIUM CHLORIDE, SODIUM LACTATE, POTASSIUM CHLORIDE, CALCIUM CHLORIDE: 600; 310; 30; 20 INJECTION, SOLUTION INTRAVENOUS at 09:21

## 2019-03-19 NOTE — BRIEF OP NOTE
BRIEF OPERATIVE NOTE Date of Procedure: 3/19/2019 Preoperative Diagnosis: LEFT BREAST MASS 7:00 Postoperative Diagnosis: LEFT BREAST MASS  7:00 Procedure(s): LEFT BREAST EXCISION BIOPSY Surgeon(s) and Role: Damari Rivera MD - Primary Surgical Assistant: Jean Pierre Garcia Surgical Staff: 
Circ-1: Wolfgang Rodriguez RN Registered Nurse Assistant: Lydia Diaz RN Scrub RN-1: Marisela Melchor RN Event Time In Time Out Incision Start 7369 Incision Close 0692 Anesthesia: General  
Estimated Blood Loss: 5 ml Specimens:  
ID Type Source Tests Collected by Time Destination 1 : LEFT BREAST EXCISIONAL BIOPSY Fresh Breast  Adonis Deluna MD 3/19/2019 9941 Pathology Findings: left breast mass removed Complications: none Implants: * No implants in log * Dictated [id number]

## 2019-03-19 NOTE — OP NOTES
[address] [facility]  OPERATIVE REPORT    Name:  Shasha Jeff  MR#:  [id number]  :  1975  ACCOUNT #:  [de-identified]  DATE OF SERVICE:  2019      PREOPERATIVE DIAGNOSIS:  Left breast mass at 7 o'clock. POSTOPERATIVE DIAGNOSIS:  Left breast mass at 7 o'clock. PROCEDURE PERFORMED:  Left breast excisional biopsy. SURGEON:  Teresa Aguilar MD    ASSISTANT:  Saroj Keith. ANESTHESIA:  General.    COMPLICATIONS:  None. SPECIMENS REMOVED:  Left breast excisional biopsy. IMPLANTS:  None. ESTIMATED BLOOD LOSS:  5 mL. FINDINGS:  Left breast mass removed. INDICATIONS FOR PROCEDURE:  This 58-year-old female with a symptomatic left painful breast lump. She has had mammograms and MRIs that did not show anything suspicious in this area. She does have a history of fibrocystic breast disease; however, she wished to have this excised due to her symptoms. PROCEDURE IN DETAIL:  The patient was seen in the preoperative holding area where surgical site was marked by surgeon and confirmed by the patient and informed consent was obtained. She was taken to the operating room and laid in supine position while LMA anesthesia was induced. Left breast was prepped and draped in usual fashion. A time-out was performed. Attention was turned to the left breast at 7 o'clock. A periareolar incision was made with a #10 blade. Bovie cautery was used to dissect down and around the palpable mass to the chest wall. This was excised and marked short stitch superior and long stitch lateral and sent for permanent pathology. The cavity was irrigated. Bovie cautery was used to obtain hemostasis. 30 mL of local anesthetic was injected in the breast tissue and skin. Deeper tissues were closed with interrupted 3-0 Vicryl and the skin with interrupted 3-0 Vicryl and 4-0 subcuticular Monocryl. Skin glue was placed on the skin. All sponge, needle, and instrument counts were correct.   The patient went to recovery in stable condition.         Michael Obregon MD      [id number]  D:  03/19/2019 10:14  T:  03/19/2019 12:22  JOB #:  [id number]

## 2019-03-19 NOTE — TELEPHONE ENCOUNTER
Received a message from \"Hello\" that patient was having a panic attack. She had surgery this morning. I called the patient and spoke to her. She says that when she got home from the hospital she felt very anxious and had a lot of panic. She is feeling some better now. Has a history of panic attacks. I suggested that perhaps a medication that she had at the time of surgery initiated a little bit of anxiety, and now that she is a little further out from the surgery this has decreased some. She does not want to take the oxycodone, and I told her that was fine and that Tylenol and ice may be sufficient for the discomfort. Says that Dr. Fabiana Roberts told her she can take ibuprofen three days after her surgery and can take her Ativan now if needed. I recommended that she rest, use her ice pack today and tomorrow and take her Ativan and Tylenol if needed. She is in agreement with this plan and will call if she has any further problems. She was very appreciative of the phone call.

## 2019-03-19 NOTE — ROUTINE PROCESS
Patient: Yina Canseco MRN: [id number]  SSN: [id number] YOB: 1975  Age: 39 y.o. Sex: female Patient is status post Procedure(s): LEFT BREAST EXCISION BIOPSY WITH ULTRASOUND. Surgeon(s) and Role: Ridge Diaz MD - Primary Local/Dose/Irrigation:  SEE MAR Peripheral IV 03/19/19 Right;Posterior Hand (Active) Site Assessment Clean, dry, & intact 3/19/2019  9:15 AM  
Phlebitis Assessment 0 3/19/2019  9:15 AM  
Infiltration Assessment 0 3/19/2019  9:15 AM  
Dressing Status Clean, dry, & intact 3/19/2019  9:15 AM  
Dressing Type Transparent 3/19/2019  9:15 AM  
Hub Color/Line Status Pink 3/19/2019  9:15 AM  
        
Airway - Endotracheal Tube 03/19/19 (Active) Dressing/Packing:  Wound Breast Left-Dressing Type : Topical skin adhesive/glue (03/19/19 0939) Splint/Cast:  ] Other:

## 2019-03-19 NOTE — DISCHARGE INSTRUCTIONS
Discharge Instructions from Dr. Katarzyna Castillo    · I will call you with the pathology results, typically within 1 week from today. · You may shower, but no hot tubs, swimming pools, or baths until your incision is healed. · No heavy lifting with the affected extremity (nothing greater than 5 pounds), and limit its use for the next 4-5 days. · You may use an ice pack for comfort for the next couple of days, but do not place ice directly on the skin. Rather, use a towel or clothing to serve as a barrier between skin and ice to prevent injury. · If I placed a drain, follow the drain instructions provided, especially as you keep a record of the drain output. · Follow medication instructions carefully. No aspirin, ibuprofen or aleve x 1 week. May take tylenol instead of narcotic. · Wear surgical dressing for 24 hours, then remove. Wear supportive bra at all times. · You will have bruising and swelling  · Watch for signs of infection as listed below. · Redness  · Swelling  · Drainage from the incision or from your nipple that appears infected  · Fever over 101.5 degrees for consecutive readings, or over 99.5 if you are currently undergoing chemotherapy. · Call our office (number is below) for a follow-up appointment. · If you have any problems, our phone number is [phone]    You had IV Tylenol at 930 am today. No additional tylenol or percocet until 330 pm.      DISCHARGE SUMMARY from Nurse    PATIENT INSTRUCTIONS:    After general anesthesia or intravenous sedation, for 24 hours or while taking prescription Narcotics:  · Limit your activities  · Do not drive and operate hazardous machinery  · Do not make important personal or business decisions  · Do  not drink alcoholic beverages  · If you have not urinated within 8 hours after discharge, please contact your surgeon on call.     Report the following to your surgeon:  · Excessive pain, swelling, redness or odor of or around the surgical area  · Temperature over 100.5  · Nausea and vomiting lasting longer than 4 hours or if unable to take medications  · Any signs of decreased circulation or nerve impairment to extremity: change in color, persistent  numbness, tingling, coldness or increase pain  · Any questions    What to do at Home:  Recommended activity: See surgical instructions,     If you experience any of the following symptoms as instructed, please follow up with Dr Schuyler Oro. *  Please give a list of your current medications to your Primary Care Provider. *  Please update this list whenever your medications are discontinued, doses are      changed, or new medications (including over-the-counter products) are added. *  Please carry medication information at all times in case of emergency situations. These are general instructions for a healthy lifestyle:    No smoking/ No tobacco products/ Avoid exposure to second hand smoke  Surgeon General's Warning:  Quitting smoking now greatly reduces serious risk to your health. Obesity, smoking, and sedentary lifestyle greatly increases your risk for illness    A healthy diet, regular physical exercise & weight monitoring are important for maintaining a healthy lifestyle    You may be retaining fluid if you have a history of heart failure or if you experience any of the following symptoms:  Weight gain of 3 pounds or more overnight or 5 pounds in a week, increased swelling in our hands or feet or shortness of breath while lying flat in bed. Please call your doctor as soon as you notice any of these symptoms; do not wait until your next office visit. Recognize signs and symptoms of STROKE:    F-face looks uneven    A-arms unable to move or move unevenly    S-speech slurred or non-existent    T-time-call 911 as soon as signs and symptoms begin-DO NOT go       Back to bed or wait to see if you get better-TIME IS BRAIN. Warning Signs of HEART ATTACK     Call 911 if you have these symptoms:   Chest discomfort. Most heart attacks involve discomfort in the center of the chest that lasts more than a few minutes, or that goes away and comes back. It can feel like uncomfortable pressure, squeezing, fullness, or pain.  Discomfort in other areas of the upper body. Symptoms can include pain or discomfort in one or both arms, the back, neck, jaw, or stomach.  Shortness of breath with or without chest discomfort.  Other signs may include breaking out in a cold sweat, nausea, or lightheadedness. Don't wait more than five minutes to call 911 - MINUTES MATTER! Fast action can save your life. Calling 911 is almost always the fastest way to get lifesaving treatment. Emergency Medical Services staff can begin treatment when they arrive -- up to an hour sooner than if someone gets to the hospital by car. The discharge information has been reviewed with the patient and spouse. The patient and spouse verbalized understanding. Discharge medications reviewed with the patient and spouse and appropriate educational materials and side effects teaching were provided.   ___________________________________________________________________________________________________________________________________

## 2019-03-19 NOTE — H&P
HISTORY OF PRESENT ILLNESS  Laura Navarro is a 39 y.o. female. HPI  NEW patient here today referred for consultation at the request of Dr. Tameka Dolan for LEFT breast lump and high risk. She has palpated painful lump in LEFT breast 7:00 at [facility] for 10 years. Lump continues to be painful. She had biopsy of this lump about 4 years ago, but was found to be a cyst and filled back up immediately. She also reports having RIGHT breast lumps, but she is unable to palpate. Denies any nipple discharge/retraction or skin changes.      FH includes-  Mother diagnosed with lung cancer, . Maternal grandmother diagnosed with uterine cancer, . Paternal grandmother diagnosed with breast cancer, .      Recent imaging-   3D bilateral screening mammogram on 9/10/18- [facility] 0  LEFT breast ultrasound on 9/10/18- [facility] 2 cysts in left breast 1:00     She gets breast MRIs due to her high risk.  Previously calculated tyrer-melba is 21.5%           Past Medical History:   Diagnosis Date    Lupus      Musculoskeletal disorder      Thyroid disease              Past Surgical History:   Procedure Laterality Date    HX HEENT         jaw surgery    HX LAP CHOLECYSTECTOMY   2/24/15     by Dr. Rodolfo Pineda [address] Left      benign      Social History            Social History    Marital status:        Spouse name: N/A    Number of children: N/A    Years of education: N/A          Occupational History    Not on file.           Social History Main Topics    Smoking status: Current Every Day Smoker    Smokeless tobacco: Current User    Alcohol use No    Drug use: Not on file    Sexual activity: Not on file           Other Topics Concern    Not on file      Social History Narrative      OB History      Obstetric Comments     Menarche 5, LMP current, # of children 1, age of 3st delivery 27, Hysterectomy/oophorectomy no/no, Breast bx yes, left 4 years ago, history of breast feeding no, BCP yes, Hormone therapy no                Current Outpatient Prescriptions:     sertraline (ZOLOFT) 25 mg tablet, Take 25 mg by mouth daily. , Disp: , Rfl:     levothyroxine (SYNTHROID) 50 mcg tablet, Take 50 mcg by mouth Daily (before breakfast). , Disp: , Rfl:     LORazepam (ATIVAN) 0.5 mg tablet, Take 0.25 mg by mouth every eight (8) hours as needed for Anxiety. , Disp: , Rfl:     IBUPROFEN (ADVIL PO), Take  by mouth as needed. , Disp: , Rfl:        Allergies   Allergen Reactions    Topamax [Topiramate] Vertigo               Review of Systems   Constitutional: Positive for malaise/fatigue. Eyes: Negative. Respiratory: Negative. Cardiovascular: Positive for chest pain. Gastrointestinal: Positive for abdominal pain and diarrhea. Genitourinary: Negative. Musculoskeletal: Positive for back pain and joint pain. Skin: Negative. Neurological: Positive for headaches. Endo/Heme/Allergies: Negative. Psychiatric/Behavioral: Negative.          Physical Exam   Constitutional: She is oriented to person, place, and time. She appears well-developed and well-nourished. HENT:   Head: Normocephalic. Eyes: EOM are normal.   Neck: Neck supple. Cardiovascular: Intact distal pulses. Pulmonary/Chest: Effort normal. Right breast exhibits no inverted nipple, no mass, no nipple discharge, no skin change and no tenderness. Left breast exhibits mass (mass at 7:00 at [facility]. cysts seen on us. ) and tenderness. Left breast exhibits no inverted nipple, no nipple discharge and no skin change. Breasts are symmetrical.   Abdominal: Soft. Musculoskeletal: Normal range of motion. Lymphadenopathy:     She has no cervical adenopathy. She has no axillary adenopathy. Neurological: She is alert and oriented to person, place, and time. Skin: Skin is warm and dry. Psychiatric: She has a normal mood and affect.    Nursing note and vitals reviewed.        ASSESSMENT and PLAN      ICD-10-CM ICD-9-CM     1. Family history of breast cancer Z80.3 V16.3     2. Fibrocystic disease of left breast N60.12 610. 1        40 yo female high risk with chronic left breast lump  She has focal pain in this area and has had aspirations in the past.   She wishes to have this removed. I think this is reasonable but needs mri first for high risk. She is fine with the plan. Addendum: mri shows no enhancement.    Patient still wants excision  Will do left breast excisional biopsy

## 2019-03-19 NOTE — ANESTHESIA POSTPROCEDURE EVALUATION
Procedure(s): LEFT BREAST EXCISION BIOPSY WITH ULTRASOUND. Anesthesia Post Evaluation Patient location during evaluation: PACU Patient participation: complete - patient participated Level of consciousness: awake Pain management: adequate Airway patency: patent Anesthetic complications: no 
Cardiovascular status: hemodynamically stable Respiratory status: acceptable Hydration status: acceptable Comments: I have seen and evaluated the patient. The patient is ready for PACU discharge. [address], DO Visit Vitals /70 Pulse 85 Temp 36.6 °C (97.9 °F) Resp 17 Ht 5' 3\" (1.6 m) Wt 72.1 kg (159 lb) SpO2 97% BMI 28.17 kg/m²

## 2019-03-20 ENCOUNTER — TELEPHONE (OUTPATIENT)
Dept: SURGERY | Age: 44
End: 2019-03-20

## 2019-03-20 LAB
BACTERIA SPEC CULT: NORMAL
BACTERIA SPEC CULT: NORMAL
SERVICE CMNT-IMP: NORMAL

## 2019-03-20 NOTE — TELEPHONE ENCOUNTER
Called patient to check on her post op day 1. Left message to call us back with any questions or concerns.

## 2019-03-21 ENCOUNTER — TELEPHONE (OUTPATIENT)
Dept: SURGERY | Age: 44
End: 2019-03-21

## 2019-04-08 ENCOUNTER — OFFICE VISIT (OUTPATIENT)
Dept: SURGERY | Age: 44
End: 2019-04-08

## 2019-04-08 VITALS
BODY MASS INDEX: 28.46 KG/M2 | HEART RATE: 81 BPM | HEIGHT: 63 IN | DIASTOLIC BLOOD PRESSURE: 85 MMHG | WEIGHT: 160.6 LBS | SYSTOLIC BLOOD PRESSURE: 140 MMHG

## 2019-04-08 DIAGNOSIS — N64.89 RADIAL SCAR OF BREAST: Primary | ICD-10-CM

## 2019-04-08 NOTE — PATIENT INSTRUCTIONS

## 2019-04-08 NOTE — PROGRESS NOTES
HISTORY OF PRESENT ILLNESS  Micha Arambula is a 39 y.o. female. HPI ESTABLISHED patient here today for post op follow up LEFT breast excisional biopsy for fibrocystic changes,with nodular adenosis and radial scar. The patient is doing well and states she has no signs of infection noted. No pain is reported. FINAL PATHOLOGIC DIAGNOSIS   Breast, left, excision margin evaluation:   Fibrocystic changes with nodular adenosis and radial scar   Intraluminal microcalcifications are present   No malignancy identified   See comment   Comment   A biopsy clip is identified grossly, however, no definite histologic evidence of a prior biopsy site is identified. Correlation with clinical and radiographic findings recommended to ensure adequate sampling of the intended lesion     Review of Systems   All other systems reviewed and are negative. Physical Exam   Pulmonary/Chest:       Left breast incision healing well     Nursing note and vitals reviewed.       ASSESSMENT and PLAN    ICD-10-CM ICD-9-CM    1. Radial scar of breast N64.89 611.89      - healing well  - f/u prn

## 2019-10-08 ENCOUNTER — TELEPHONE (OUTPATIENT)
Dept: NEUROLOGY | Age: 44
End: 2019-10-08

## 2019-10-08 ENCOUNTER — OFFICE VISIT (OUTPATIENT)
Dept: NEUROLOGY | Age: 44
End: 2019-10-08

## 2019-10-08 VITALS
RESPIRATION RATE: 16 BRPM | WEIGHT: 158 LBS | OXYGEN SATURATION: 100 % | DIASTOLIC BLOOD PRESSURE: 70 MMHG | HEIGHT: 63 IN | HEART RATE: 80 BPM | SYSTOLIC BLOOD PRESSURE: 125 MMHG | BODY MASS INDEX: 28 KG/M2

## 2019-10-08 DIAGNOSIS — G43.709 CHRONIC MIGRAINE W/O AURA W/O STATUS MIGRAINOSUS, NOT INTRACTABLE: Primary | ICD-10-CM

## 2019-10-08 RX ORDER — LORAZEPAM 0.5 MG/1
TABLET ORAL
COMMUNITY

## 2019-10-08 NOTE — PROGRESS NOTES
Chief Complaint   Patient presents with    Migraine       Referred by: Dr Trevor CROW    Sunni Pena is a 76-year-old woman here to establish care for migraine. She has had migraines ever since age 8 she tells me and has a headache every single day fluctuating in severity. She also has concomitant lupus and fibromyalgia. Her headaches are described as typically bifrontal retro-orbital sometimes bitemporal jaw pain throbbing pounding with light and sound sensitivity worse with movement. She has frequent severe debilitating escalations weekly. She also takes excessive amounts of ibuprofen and Advil. She has rebound headache. No unusual numbness or weakness. Headaches have remained the same for several years and remained consistent daily. Migraine medication history includes topiramate, amitriptyline, sertraline, propranolol      Review of Systems   Constitutional: Positive for malaise/fatigue. Eyes: Positive for photophobia. Negative for double vision. Gastrointestinal: Positive for nausea. Musculoskeletal: Positive for myalgias and neck pain. Neurological: Positive for headaches. Negative for seizures and weakness. All other systems reviewed and are negative.       Past Medical History:   Diagnosis Date    Lupus ([address]     Musculoskeletal disorder     Thyroid disease      Family History   Problem Relation Age of Onset    Heart Disease Mother     Hypertension Mother     Stroke Mother     Cancer Mother         lung    Cancer Father         skin     Uterine Cancer Maternal Grandmother     Breast Cancer Paternal Grandmother      Social History     Socioeconomic History    Marital status:      Spouse name: Not on file    Number of children: Not on file    Years of education: Not on file    Highest education level: Not on file   Occupational History    Not on file   Social Needs    Financial resource strain: Not on file    Food insecurity:     Worry: Not on file     Inability: Not on file    Transportation needs:     Medical: Not on file     Non-medical: Not on file   Tobacco Use    Smoking status: Current Every Day Smoker    Smokeless tobacco: Current User   Substance and Sexual Activity    Alcohol use: No    Drug use: Not on file    Sexual activity: Not on file   Lifestyle    Physical activity:     Days per week: Not on file     Minutes per session: Not on file    Stress: Not on file   Relationships    Social connections:     Talks on phone: Not on file     Gets together: Not on file     Attends Anabaptism service: Not on file     Active member of club or organization: Not on file     Attends meetings of clubs or organizations: Not on file     Relationship status: Not on file    Intimate partner violence:     Fear of current or ex partner: Not on file     Emotionally abused: Not on file     Physically abused: Not on file     Forced sexual activity: Not on file   Other Topics Concern    Not on file   Social History Narrative    Not on file     Current Outpatient Medications   Medication Sig    LORazepam (ATIVAN) 0.5 mg tablet Take  by mouth.  sertraline (ZOLOFT) 25 mg tablet Take 25 mg by mouth daily.  levothyroxine (SYNTHROID) 50 mcg tablet Take 50 mcg by mouth Daily (before breakfast). No current facility-administered medications for this visit. Allergies   Allergen Reactions    Topamax [Topiramate] Vertigo         Neurologic Exam     Mental Status   Oriented to person, place, and time. Cranial Nerves   Cranial nerves II through XII intact. Motor Exam   Muscle bulk: normal    Strength   Strength 5/5 throughout. Sensory Exam   Light touch normal.     Gait, Coordination, and Reflexes     Gait  Gait: normal    Coordination   Romberg: negative    Tremor   Resting tremor: absent    Physical Exam   Constitutional: She is oriented to person, place, and time. She appears well-developed and well-nourished. Cardiovascular: Normal rate.    Pulmonary/Chest: Effort normal.   Neurological: She is oriented to person, place, and time. She has normal strength. She has a normal Romberg Test. Gait normal.   Skin: Skin is warm and dry. Psychiatric: Her behavior is normal.   Vitals reviewed. Visit Vitals  /70 (BP 1 Location: Left arm, BP Patient Position: Sitting)   Pulse 80   Resp 16   Ht 5' 3\" (1.6 m)   Wt 71.7 kg (158 lb)   SpO2 100%   BMI 27.99 kg/m²       Lab Results   Component Value Date/Time    WBC 8.9 06/25/2015 02:21 PM    HGB 16.9 (H) 06/25/2015 02:21 PM    HCT 48.9 (H) 06/25/2015 02:21 PM    PLATELET 126 47/00/0389 02:21 PM    MCV 93.3 06/25/2015 02:21 PM     Lab Results   Component Value Date/Time    ALT (SGPT) 26 06/25/2015 02:21 PM    AST (SGOT) 20 06/25/2015 02:21 PM    Alk. phosphatase 86 06/25/2015 02:21 PM    Bilirubin, total 0.4 06/25/2015 02:21 PM    Albumin 4.3 06/25/2015 02:21 PM    Protein, total 8.3 (H) 06/25/2015 02:21 PM    PLATELET 685 89/00/0607 02:21 PM        CT Results (maximum last 3): Results from [place] encounter on 07/21/15   CT NECK SOFT TISSUE W CONT    Narrative **Final Report**       ICD Codes / Adm. Diagnosis: 527.2  784.2 / Sialoadenitis  Swelling, mass, or   lump in hea  Examination:  CT SOFT TISSUE NECK W CON  - [id number] - Jul 21 2015 11:54AM  Accession No:  [id number]  Reason:  neck mass/ sialoadenitis vs lithiasis      REPORT:  EXAM:  CT SOFT TISSUE NECK W CON    INDICATION:  neck mass/ sialoadenitis vs lithiasis    COMPARISON: None. CONTRAST: 97 mL of Isovue-300. TECHNIQUE: Multislice helical CT was performed from the mid calvarium to the   aortic arch during uneventful rapid bolus intravenous contrast   administration. Contiguous 2.5 mm axial images were reconstructed and lung   and soft tissue windows were generated. Coronal and sagittal reformations   were generated. FINDINGS:    No mass or adenopathy is identified within the neck. The carotid and jugular vessels enhance normally.      The thyroid gland, submandibular salivary glands and parotid glands are   normal.    No nasopharyngeal, pharyngeal or laryngeal mass is identified. No abnormalities are identified in the visualized portions of the brain or   orbits. The visualized mastoid air cells and paranasal sinuses are clear. The visualized portions of the lung apices are clear. IMPRESSION: Normal CT of the soft tissues of the neck. Specifically there is   no evidence of sialoadenitis or sialolithiasis. Signing/Reading Doctor: Alphonse Gomez ([id number])    Approved: Alphonse Gomez ([id number])  Jul 21 2015  2:59PM                                 MRI Results (maximum last 3): Results from [place] encounter on 11/12/18   MRI BREAST BI W WO CONT    Narrative MRI BREAST BI W WO CONT, 11/12/2018 1:22 PM  INDICATION: Family history of malignant neoplasm of breast  HISTORY: High risk patient. Lifetime risk of 21.5%. Breast lumps. COMPARISON: Previous mammogram and ultrasound from 9/10/2018. Previous  mammogram, 6/3/2015. Previous MRI of the breast, 7/22/2015  . TECHNIQUE:  Bilateral breast MRI was performed using a dedicated breast coil without  compression with the patient in the prone position. Precontrast T1-weighted  images with fat suppression were obtained followed by bolus injection of  contrast. Postcontrast dynamic and high-resolution images were acquired. T2-weighted axial imaging with fat suppression was also performed. The images  were analyzed using CAD analysis, enhancement curves, digital subtraction, and 2  and 3 dimensional reconstructions. Sushil Elza RIGHT BREAST:  Background parenchymal enhancement: Mild   Multiple, simple appearing cysts. There is no area of masslike or nonmasslike  enhancement which is concerning. There is a small area of enhancement in the  posterior 3rd of the right breast laterally which is unchanged.     There are no suspicious internal mammary or axillary chain lymph nodes. Stormy Lowry LEFT BREAST:  Background parenchymal enhancement: Mild  Multiple, simple appearing cysts. There is no area of masslike or nonmasslike  enhancement which is concerning. There are no suspicious internal mammary or axillary chain lymph nodes. .    Impression IMPRESSION:  RIGHT BREAST:  1. Likely fibroadenoma in the posterior and lateral aspect of the right breast.  BI-RADS Category 2 - Benign findings. Stormy Lowry LEFT BREAST:  1. BI-RADS Category 1 - Negative. RECOMMENDATION:  Continued yearly screening MRI in this high risk patient. Results from Hospital Encounter encounter on 07/22/15   MRI BREAST BILAT MARISOL LIM CON CAD EX    Narrative **Final Report**       ICD Codes / Adm. Diagnosis: V16.3  610.1 / Family history of malignant ne    Diffuse cystic mastopathy  Examination:  MR BREAST CATRACHITA Remy CON CAD EX  - [id number] - Jul 22 2015    9:43AM  Accession No:  [id number]  Reason:  family hx breast cancer, fibrocystic breast, breast mass      REPORT:  HISTORY:  48-year-old female with high risk status for breast cancer   (lifetime risk assessment 21.5% Philis Bevels). Family history for breast   carcinoma in paternal grandmother. Dense breast parenchyma. Day 12 of   menstrual cycle. Now complaining of left breast lump at 6:00, present for 7   years but increasing in size currently. Numerous cysts sonographically,   including the 6:00 position on the left. EXAM: BILATERAL BREAST MRI WITH AND WITHOUT CONTRAST. CONTRAST: 13 mL Magnevist.     ANESTHESIA: None. PROCEDURE: Bilateral high resolution dynamic and morphologic enhanced breast   MRI was performed using a dedicated breast coil in the prone position. Pre-   and postcontrast axial and sagittal T1 and STIR images were performed. These   were post-processed using CAD kinetic analysis, digital subtraction,   enhancement curves and 2D and 3D reconstructions. COMPARISON:  Recent mammography and ultrasonography 6/3/2013  .     FINDINGS: Background parenchymal enhancement is moderate to marked. Left breast: In the left lower breast at the site of the palpable   abnormality at 6:30 in the mid coronal third, a focal regional consolidated   non-masslike enhancement shows delayed and dynamic persistent enhancement . There is no discrete mass and no washout enhancement. Numerous other foci of   delayed and dynamic enhancement are scattered throughout the left breast.   There is no obvious lymphadenopathy, but an 8 mm oval lymph node lies in the   axillary tail of the breast. A fatty central hilum is not prominent, likely   due to its small size. Right breast: Numerous foci of delayed and dynamic enhancement with no   dominant abnormality to suggest malignancy. No lymphadenopathy. IMPRESSION:  1. Left BI-RADS zero, incomplete. Right BI-RADS 2, benign. 2. LEFT BREAST: Dominant focal regional non-masslike enhancement at the site   of an increasing palpable abnormality in a patient with approximately 30%   lifetime risk assessments for breast cancer. While this is thought to   represent a focal asymmetric island of fibroproliferative change, second   look ultrasonography in the left breast at 6:30, preferably by an MRI   radiologist while coordinating with MRI findings, is recommended. If there   is no suggestion of malignancy sonographically, close followup by breast   self-examination and physical examination is recommended for the palpable   abnormality, with repeat mammography and MRI in one year. 3. RIGHT BREAST: No MRI sign of malignancy at this time. 4. GENERAL RECOMMENDATION: Annual mammography and annual MRI in this   high-risk patient. 5. A summary portfolio has been created for reference and is available in   PACS.     A negative breast MRI examination has high sensitivity and moderate   specificity, and speaks strongly against invasive cancer down to a detection   threshold of  4-5 mm, but may not detect some lower grade or in situ carcinomas. Therefore, MRI should not be used to avoid an o/w indicated   biopsy. Due to the prone positioning for this exam, the described location   of MR findings may differ from other studies. Routine clinical and   mammographic followup are recommended. When available, pathology followup is   appreciated. Signing/Reading Doctor: Emmanuel Gil ([id number])    Approved: Emmanuel Gil ([id number])  Jul 22 2015  3:49PM                                PET Results (maximum last 3): No results found for this or any previous visit. Assessment and Plan   Diagnoses and all orders for this visit:    1. Chronic migraine w/o aura w/o status migrainosus, not intractable      76-year-old woman with lifelong history of chronic migraine headache. She has been on multiple medications. We talked about the options remaining. She declines gabapentin. She does not want to utilize Depakote either given the side effects. Her only options left are to consider injectable CGRP inhibitors and Botox. We talked about all of these options at length. I recommend that she do her own research and think about her choice. I think she should contact her insurance company to determine which medication would be approved and once she makes her decision she should let us know and we will move forward. I do not guarantee she will be headache free but it is possible that these treatments could offer a reduction in headache frequency and/or intensity. She will call and let us know. I reviewed and decided to continue the current medications. This clinical note was dictated with an electronic dictation software that can make unintentional errors. If there are any questions, please contact me directly for clarification. A notice of this visit/encounter being completed has been sent electronically to the patient's PCP and/or referring provider.      [address] [place] [address], [facility] [address]  Diplomate ALEKSANDARN

## 2019-10-08 NOTE — TELEPHONE ENCOUNTER
----- Message from Kandace Yen sent at 10/8/2019  3:35 PM EDT -----  Regarding: Dr. Vladimir Lora requesting the office to provide pt with CPT codes for an upcoming procedure in order to verify if it will be covered. Best contact [phone].

## 2019-10-08 NOTE — LETTER
10/8/19 Patient: Zoltan Hardwick YOB: 1975 Date of Visit: 10/8/2019 Zainab Coleman MD 
[address] [place] [address] [address] [facility] [phone] VIA Facsimile: [phone] Dear Zainab Coleman MD, Thank you for referring Ms. Victor M Ortega to [address] [place] [address] for evaluation. My notes for this consultation are attached. If you have questions, please do not hesitate to call me. I look forward to following your patient along with you. Sincerely, [facility], DO 
 
10/8/2019 Patient:  Zoltan Hardwick YOB: 1975 Date of Visit: 10/8/2019 Deaanthony Coleman MD 
[address] [place] [address] [phone] [facility] 7 [address] VIA Facsimile: [phone] 
 : 
 
 
I was requested by Anamika Bird MD to evaluate Ms. Victor M Ortega  for Chief Complaint Patient presents with  Migraine Alexandria Mort I am recommending the following:  
 
Diagnoses and all orders for this visit: 
 
1. Chronic migraine w/o aura w/o status migrainosus, not intractable 
 
 
 
---------------------------------------------------------------------------------------------------------------------- Below is my encounter: Chief Complaint Patient presents with  Migraine Referred by: Dr Anabella Corrigan MIGNON Alcocer is a 69-year-old woman here to establish care for migraine. She has had migraines ever since age 8 she tells me and has a headache every single day fluctuating in severity. She also has concomitant lupus and fibromyalgia. Her headaches are described as typically bifrontal retro-orbital sometimes bitemporal jaw pain throbbing pounding with light and sound sensitivity worse with movement. She has frequent severe debilitating escalations weekly. She also takes excessive amounts of ibuprofen and Advil. She has rebound headache.   No unusual numbness or weakness. Headaches have remained the same for several years and remained consistent daily. Migraine medication history includes topiramate, amitriptyline, sertraline, propranolol Review of Systems Constitutional: Positive for malaise/fatigue. Eyes: Positive for photophobia. Negative for double vision. Gastrointestinal: Positive for nausea. Musculoskeletal: Positive for myalgias and neck pain. Neurological: Positive for headaches. Negative for seizures and weakness. All other systems reviewed and are negative. Past Medical History:  
Diagnosis Date  Lupus ([facility] [address]  Musculoskeletal disorder  Thyroid disease Family History Problem Relation Age of Onset  Heart Disease Mother  Hypertension Mother  Stroke Mother  Cancer Mother   
     lung  Cancer Father   
     skin  Uterine Cancer Maternal Grandmother  Breast Cancer Paternal Grandmother Social History Socioeconomic History  Marital status:  Spouse name: Not on file  Number of children: Not on file  Years of education: Not on file  Highest education level: Not on file Occupational History  Not on file Social Needs  Financial resource strain: Not on file  Food insecurity:  
  Worry: Not on file Inability: Not on file  Transportation needs:  
  Medical: Not on file Non-medical: Not on file Tobacco Use  Smoking status: Current Every Day Smoker  Smokeless tobacco: Current User Substance and Sexual Activity  Alcohol use: No  
 Drug use: Not on file  Sexual activity: Not on file Lifestyle  Physical activity:  
  Days per week: Not on file Minutes per session: Not on file  Stress: Not on file Relationships  Social connections:  
  Talks on phone: Not on file Gets together: Not on file Attends Yarsanism service: Not on file Active member of club or organization: Not on file Attends meetings of clubs or organizations: Not on file Relationship status: Not on file  Intimate partner violence:  
  Fear of current or ex partner: Not on file Emotionally abused: Not on file Physically abused: Not on file Forced sexual activity: Not on file Other Topics Concern  Not on file Social History Narrative  Not on file Current Outpatient Medications Medication Sig  LORazepam (ATIVAN) 0.5 mg tablet Take  by mouth.  sertraline (ZOLOFT) 25 mg tablet Take 25 mg by mouth daily.  levothyroxine (SYNTHROID) 50 mcg tablet Take 50 mcg by mouth Daily (before breakfast). No current facility-administered medications for this visit. Allergies Allergen Reactions  Topamax [Topiramate] Vertigo Neurologic Exam  
 
Mental Status Oriented to person, place, and time. Cranial Nerves Cranial nerves II through XII intact. Motor Exam  
Muscle bulk: normal 
 
Strength Strength 5/5 throughout. Sensory Exam  
Light touch normal.  
 
Gait, Coordination, and Reflexes Gait Gait: normal 
 
Coordination Romberg: negative Tremor Resting tremor: absent Physical Exam  
Constitutional: She is oriented to person, place, and time. She appears well-developed and well-nourished. Cardiovascular: Normal rate. Pulmonary/Chest: Effort normal.  
Neurological: She is oriented to person, place, and time. She has normal strength. She has a normal Romberg Test. Gait normal.  
Skin: Skin is warm and dry. Psychiatric: Her behavior is normal.  
Vitals reviewed. Visit Vitals /70 (BP 1 Location: Left arm, BP Patient Position: Sitting) Pulse 80 Resp 16 Ht 5' 3\" (1.6 m) Wt 71.7 kg (158 lb) SpO2 100% BMI 27.99 kg/m² Lab Results Component Value Date/Time  WBC 8.9 06/25/2015 02:21 PM  
 HGB 16.9 (H) 06/25/2015 02:21 PM  
 HCT 48.9 (H) 06/25/2015 02:21 PM  
 PLATELET 256 93/20/7397 02:21 PM  
 MCV 93.3 06/25/2015 02:21 PM  
 
 Lab Results Component Value Date/Time ALT (SGPT) 26 06/25/2015 02:21 PM  
 AST (SGOT) 20 06/25/2015 02:21 PM  
 Alk. phosphatase 86 06/25/2015 02:21 PM  
 Bilirubin, total 0.4 06/25/2015 02:21 PM  
 Albumin 4.3 06/25/2015 02:21 PM  
 Protein, total 8.3 (H) 06/25/2015 02:21 PM  
 PLATELET 320 15/26/8381 02:21 PM  
  
 
CT Results (maximum last 3): [place] [facility] [place] CT NECK SOFT TISSUE W CONT Narrative **Final Report** 
  
 
ICD Codes / Adm. Diagnosis: 527.2  784.2 / Sialoadenitis  Swelling, mass, or  
lump in hea Examination:  CT SOFT TISSUE NECK W CON  - [id number] - Jul 21 2015 11:54AM 
Accession No:  [id number] Reason:  neck mass/ sialoadenitis vs lithiasis REPORT: 
EXAM:  CT SOFT TISSUE NECK W CON 
 
INDICATION:  neck mass/ sialoadenitis vs lithiasis COMPARISON: None. CONTRAST: 97 mL of Isovue-300. TECHNIQUE: Multislice helical CT was performed from the mid calvarium to the  
aortic arch during uneventful rapid bolus intravenous contrast  
administration. Contiguous 2.5 mm axial images were reconstructed and lung  
and soft tissue windows were generated. Coronal and sagittal reformations  
were generated. FINDINGS: 
 
No mass or adenopathy is identified within the neck. The carotid and jugular vessels enhance normally. The thyroid gland, submandibular salivary glands and parotid glands are  
normal. 
 
No nasopharyngeal, pharyngeal or laryngeal mass is identified. No abnormalities are identified in the visualized portions of the brain or  
orbits. The visualized mastoid air cells and paranasal sinuses are clear. The visualized portions of the lung apices are clear. IMPRESSION: Normal CT of the soft tissues of the neck. Specifically there is  
no evidence of sialoadenitis or sialolithiasis. Signing/Reading Doctor: Elisabeth Rivers ([id number]) Miracle Casey ([id number])  Jul 21 2015  2:59PM                    
 
 
   
 
 
MRI Results (maximum last 3): Results from Hospital Encounter encounter on 11/12/18 MRI BREAST BI W WO CONT Narrative MRI BREAST BI W WO CONT, 11/12/2018 1:22 PM 
INDICATION: Family history of malignant neoplasm of breast 
HISTORY: High risk patient. Lifetime risk of 21.5%. Breast lumps. COMPARISON: Previous mammogram and ultrasound from 9/10/2018. Previous 
mammogram, 6/3/2015. Previous MRI of the breast, 7/22/2015 Onofre Hicks TECHNIQUE: 
Bilateral breast MRI was performed using a dedicated breast coil without 
compression with the patient in the prone position. Precontrast T1-weighted 
images with fat suppression were obtained followed by bolus injection of 
contrast. Postcontrast dynamic and high-resolution images were acquired. T2-weighted axial imaging with fat suppression was also performed. The images 
were analyzed using CAD analysis, enhancement curves, digital subtraction, and 2 
and 3 dimensional reconstructions. Onofre Octave RIGHT BREAST: 
Background parenchymal enhancement: Mild Multiple, simple appearing cysts. There is no area of masslike or nonmasslike 
enhancement which is concerning. There is a small area of enhancement in the 
posterior 3rd of the right breast laterally which is unchanged. There are no suspicious internal mammary or axillary chain lymph nodes. Onofre Octave LEFT BREAST: 
Background parenchymal enhancement: Mild Multiple, simple appearing cysts. There is no area of masslike or nonmasslike 
enhancement which is concerning. There are no suspicious internal mammary or axillary chain lymph nodes. .  
 Impression IMPRESSION: 
RIGHT BREAST: 
1. Likely fibroadenoma in the posterior and lateral aspect of the right breast. 
BI-RADS Category 2 - Benign findings. Onofre Octave LEFT BREAST: 
1. BI-RADS Category 1 - Negative. RECOMMENDATION: 
Continued yearly screening MRI in this high risk patient. [place] [facility] [place]15 MRI BREAST BILAT WO W CON CAD EX Narrative **Final Report** 
  
 
ICD Codes / Adm. Diagnosis: V16.3  610.1 / Family history of malignant ne Diffuse cystic mastopathy Examination:  MR BREAST BILAT Byron Marilin CON CAD EX  - [id number] - Jul 22 2015   
9:43AM 
Accession No:  [id number] Reason:  family hx breast cancer, fibrocystic breast, breast mass REPORT: 
HISTORY:  43-year-old female with high risk status for breast cancer  
(lifetime risk assessment 21.5% Stann Patient). Family history for breast  
carcinoma in paternal grandmother. Dense breast parenchyma. Day 12 of  
menstrual cycle. Now complaining of left breast lump at 6:00, present for 7  
years but increasing in size currently. Numerous cysts sonographically,  
including the 6:00 position on the left. EXAM: BILATERAL BREAST MRI WITH AND WITHOUT CONTRAST. CONTRAST: 13 mL Magnevist.  
 
ANESTHESIA: None. PROCEDURE: Bilateral high resolution dynamic and morphologic enhanced breast  
MRI was performed using a dedicated breast coil in the prone position. Pre-  
and postcontrast axial and sagittal T1 and STIR images were performed. These  
were post-processed using CAD kinetic analysis, digital subtraction,  
enhancement curves and 2D and 3D reconstructions. COMPARISON:  Recent mammography and ultrasonography 6/3/2013  . FINDINGS: Background parenchymal enhancement is moderate to marked. Left breast: In the left lower breast at the site of the palpable  
abnormality at 6:30 in the mid coronal third, a focal regional consolidated  
non-masslike enhancement shows delayed and dynamic persistent enhancement . There is no discrete mass and no washout enhancement.  Numerous other foci of  
delayed and dynamic enhancement are scattered throughout the left breast.  
There is no obvious lymphadenopathy, but an 8 mm oval lymph node lies in the  
 axillary tail of the breast. A fatty central hilum is not prominent, likely  
due to its small size. Right breast: Numerous foci of delayed and dynamic enhancement with no  
dominant abnormality to suggest malignancy. No lymphadenopathy. IMPRESSION: 
1. Left BI-RADS zero, incomplete. Right BI-RADS 2, benign. 2. LEFT BREAST: Dominant focal regional non-masslike enhancement at the site  
of an increasing palpable abnormality in a patient with approximately 30%  
lifetime risk assessments for breast cancer. While this is thought to  
represent a focal asymmetric island of fibroproliferative change, second  
look ultrasonography in the left breast at 6:30, preferably by an MRI  
radiologist while coordinating with MRI findings, is recommended. If there  
is no suggestion of malignancy sonographically, close followup by breast  
self-examination and physical examination is recommended for the palpable  
abnormality, with repeat mammography and MRI in one year. 3. RIGHT BREAST: No MRI sign of malignancy at this time. 4. GENERAL RECOMMENDATION: Annual mammography and annual MRI in this  
high-risk patient. 5. A summary portfolio has been created for reference and is available in  
PACS. A negative breast MRI examination has high sensitivity and moderate  
specificity, and speaks strongly against invasive cancer down to a detection  
threshold of  4-5 mm, but may not detect some lower grade or in situ  
carcinomas. Therefore, MRI should not be used to avoid an o/w indicated  
biopsy. Due to the prone positioning for this exam, the described location  
of MR findings may differ from other studies. Routine clinical and  
mammographic followup are recommended. When available, pathology followup is  
appreciated. Signing/Reading Doctor: Pily Ragsdale ([id number]) ApprovedPily Ragsdale ([id number])  Jul 22 2015  3:49PM                   
 
 
   
 
 
PET Results (maximum last 3): No results found for this or any previous visit. Assessment and Plan Diagnoses and all orders for this visit: 
 
1. Chronic migraine w/o aura w/o status migrainosus, not intractable 71-year-old woman with lifelong history of chronic migraine headache. She has been on multiple medications. We talked about the options remaining. She declines gabapentin. She does not want to utilize Depakote either given the side effects. Her only options left are to consider injectable CGRP inhibitors and Botox. We talked about all of these options at length. I recommend that she do her own research and think about her choice. I think she should contact her insurance company to determine which medication would be approved and once she makes her decision she should let us know and we will move forward. I do not guarantee she will be headache free but it is possible that these treatments could offer a reduction in headache frequency and/or intensity. She will call and let us know. I reviewed and decided to continue the current medications. This clinical note was dictated with an electronic dictation software that can make unintentional errors. If there are any questions, please contact me directly for clarification. Thank you for giving me the opportunity to assist in the care of Ms. Kimberly Olivo. If you have questions, please do not hesitate to contact me. Sincerely, 812 [facility], DO Neurologist 
Brain Injury Medicine Diplomate EARNEST

## 2019-10-08 NOTE — PATIENT INSTRUCTIONS
Recurring Migraine Headache: Care Instructions  Your Care Instructions  Migraines are painful, throbbing headaches. They often start on one side of the head. They may cause nausea and vomiting and make you sensitive to light, sound, or smell. Some people may have only a few migraines throughout life. Others have them as often as several times a month. The goal of treatment is to reduce the number of migraines you have and relieve your symptoms. Even with treatment, you may continue to have migraines. You play an important role in dealing with your headaches. Work on avoiding things that seem to trigger your migraines. When you feel a headache coming on, act quickly to stop it before it gets worse. Follow-up care is a key part of your treatment and safety. Be sure to make and go to all appointments, and call your doctor if you are having problems. It's also a good idea to know your test results and keep a list of the medicines you take. How can you care for yourself at home? · Do not drive if you have taken a prescription pain medicine. · Rest in a quiet, dark room until your headache is gone. Close your eyes and try to relax or go to sleep. Do not watch TV or read. · Put a cold, moist cloth or cold pack on the painful area for 10 to 20 minutes at a time. Put a thin cloth between the cold pack and your skin. · Have someone gently massage your neck and shoulders. · Take your medicines exactly as prescribed. Call your doctor if you think you are having a problem with your medicine. You will get more details on the specific medicines your doctor prescribes. To prevent migraines  · Keep a headache diary so you can figure out what triggers your headaches. Avoiding triggers may help you prevent headaches. Record when each headache began, how long it lasted, and what the pain was like. Use words like throbbing, aching, stabbing, or dull. Write down any other symptoms you had with the headache.  These may include nausea, flashing lights or dark spots, or sensitivity to bright light or loud noise. Note if the headache occurred near your period. List anything that might have triggered the headache. Triggers may include certain foods (chocolate, cheese, wine) or odors, smoke, bright light, stress, or lack of sleep. · If your doctor has prescribed medicine for your migraines, take it as directed. You may have medicine that you take only when you get a migraine and medicine that you take all the time to help prevent migraines. ? If your doctor has prescribed medicine for when you get a headache, take it at the first sign of a migraine, unless your doctor has given you other instructions. ? If your doctor has prescribed medicine to prevent migraines, take it exactly as prescribed. Call your doctor if you think you are having a problem with your medicine. · Find healthy ways to deal with stress. Migraines are most common during or right after stressful times. Take time to relax before and after you do something that has caused a migraine in the past.  · Try to keep your muscles relaxed by keeping good posture. Check your jaw, face, neck, and shoulder muscles for tension. Try to relax them. When sitting at a desk, change positions often. Stretch for 30 seconds each hour. · Get regular sleep and exercise. · Eat regular meals, and avoid foods and drinks that often trigger migraines. These include chocolate and alcohol, especially red wine and port. Chemicals used in food, such as aspartame and monosodium glutamate (MSG), also can trigger migraines. So can some food additives, such as those found in hot dogs, smith, cold cuts, aged cheeses, and pickled foods. · Limit caffeine by not drinking too much coffee, tea, or soda. Do not quit caffeine suddenly, because that can also give you migraines. · Do not smoke or allow others to smoke around you.  If you need help quitting, talk to your doctor about stop-smoking programs and medicines. These can increase your chances of quitting for good. · If you are taking birth control pills or hormone therapy, talk to your doctor about whether they are triggering your migraines. When should you call for help? Call 911 anytime you think you may need emergency care. For example, call if:    · You have symptoms of a stroke. These may include:  ? Sudden numbness, tingling, weakness, or loss of movement in your face, arm, or leg, especially on only one side of your body. ? Sudden vision changes. ? Sudden trouble speaking. ? Sudden confusion or trouble understanding simple statements. ? Sudden problems with walking or balance. ? A sudden, severe headache that is different from past headaches.    Call your doctor now or seek immediate medical care if:    · You develop a fever and a stiff neck.     · You have new nausea and vomiting, or you cannot keep down food or liquids.    Watch closely for changes in your health, and be sure to contact your doctor if:    · You have a headache that does not get better within 1 or 2 days.     · Your headaches get worse or happen more often. Where can you learn more? Go to http://luis-yadiel.info/. Enter V975 in the search box to learn more about \"Recurring Migraine Headache: Care Instructions. \"  Current as of: March 28, 2019  Content Version: 12.2  © 2145-7127 [employer]. Care instructions adapted under license by [employer] (which disclaims liability or warranty for this information). If you have questions about a medical condition or this instruction, always ask your healthcare professional. [place] [address] any warranty or liability for your use of this information. Deciding About Taking Medicine to Prevent Migraines  How can you decide about taking medicine to prevent migraine headaches? What are migraines? Migraines are painful, throbbing headaches.  They can last from 4 to 67 hours. They often occur on only one side of your head. But you may feel them on both sides. The pain may keep you from doing your daily activities. You may take a daily medicine if you get bad migraines often. This can help prevent them. What are key points about this decision? · Medicines to prevent migraines may not stop them every time. But if you take them daily, you can reduce how many migraines you get by more than half. They can also reduce how long migraines last. And your symptoms may not be as bad. · Medicines that prevent migraines may cause side effects. You may have sleep and memory problems, upset stomach, dry mouth, or constipation. Some of these side effects may last for as long as you take the medicine. Or they may go away within a few weeks. Why might you choose to take medicine to prevent migraines? · You are willing to take medicine daily if it will help your symptoms. · You don't think the side effects of the medicine could be as bad as your migraine symptoms. · Your migraines get in the way of your work. Or they harm your relationships with friends and family. · Benefits of medicine include fewer or no migraines. And your migraines may not last as long or feel as bad. Why might you choose not to take medicine to prevent migraines? · You want to avoid the side effects of the medicine. · You don't want to take medicine every day. · Your migraines are not affecting your work and relationships. · If your symptoms don't improve with home treatment and other medicines, you can decide later to take medicine every day to help prevent migraines. Your decision  Thinking about the facts and your feelings can help you make a decision that is right for you. Be sure you understand the benefits and risks of your options, and think about what else you need to do before you make the decision. Where can you learn more? Go to http://luis-yadiel.info/.   Enter O957 in the search box to learn more about \"Deciding About Taking Medicine to Prevent Migraines. \"  Current as of: March 28, 2019  Content Version: 12.2  © 5044-3526 [employer]. Care instructions adapted under license by [employer] (which disclaims liability or warranty for this information). If you have questions about a medical condition or this instruction, always ask your healthcare professional. [place] [address] any warranty or liability for your use of this information.

## 2019-10-09 NOTE — TELEPHONE ENCOUNTER
----- Message from Jacky London sent at 10/9/2019 12:22 PM EDT -----  Regarding: Dr Kim Zavala first and last name:      Reason for call:need CPT code for her botox procedure      Callback required yes/no and why:yes, for reason given above      Best contact number(s):[phone]      Details to clarify the request:she will need this information for [facility], pt will also need to make an appt for the botox shot      Jacky London

## 2019-10-11 ENCOUNTER — TELEPHONE (OUTPATIENT)
Dept: NEUROLOGY | Age: 44
End: 2019-10-11

## 2019-10-24 NOTE — TELEPHONE ENCOUNTER
Returned phone call to patient to let her know that I will be initiating botox PA request and that the nurse will be calling her once everything has been approved to schedule her appt and have botox delivered.      Pt stated she was already scheduled for an appt on 11/15 @1:30PM.

## 2019-10-25 ENCOUNTER — TELEPHONE (OUTPATIENT)
Dept: NEUROLOGY | Age: 44
End: 2019-10-25

## 2019-11-06 ENCOUNTER — TELEPHONE (OUTPATIENT)
Dept: SURGERY | Age: 44
End: 2019-11-06

## 2019-11-06 NOTE — TELEPHONE ENCOUNTER
Returned pt's call. She is concerned about new lumps and pain that she feels in her left breast.  She is s/p excisional biopsy March 2019. I emailed Thony Batres to see if we can add her on to Dr. Porter Howard schedule next week.

## 2019-11-07 ENCOUNTER — TELEPHONE (OUTPATIENT)
Dept: SURGERY | Age: 44
End: 2019-11-07

## 2019-11-07 ENCOUNTER — TELEPHONE (OUTPATIENT)
Dept: NEUROLOGY | Age: 44
End: 2019-11-07

## 2019-11-07 NOTE — TELEPHONE ENCOUNTER
----- Message from Jeaneth [place] sent at 11/7/2019 12:26 PM EST -----  Regarding: Dr. Jez Barkley Message/Vendor Calls    Caller's first and last name: Tia Sebastian      Reason for call:pre auth for Botox      Callback required yes/no and why:yes      Best contact number(s): [phone]      Details to clarify the request: Pt stated she received a call to contact [facility] regarding her Botox, but she was advised there is no pre auth on file from the doctor, and [facility] is trying to ship the Botox today.       Jeaneth [place]

## 2019-11-07 NOTE — TELEPHONE ENCOUNTER
Re: Botox    Updated [employer] to include [facility] as servicing provider on current auth. Then called [place] to request the updated be updated current time. Called [facility] to confirm that we do have an auth on file. Per [facility], email was expedited to verifications dept to confirm authorization. Called patient to give her an update. Informed patient that [facility] stated they will call her once [facility] has been verified to complete her profile set up. I asked patient to call me back if she does not hear back from [facility] around 2pm tomorrow on 11/7.

## 2019-11-07 NOTE — TELEPHONE ENCOUNTER
Per Yordy Saleem, working her in Monday 11/11/19 at [facility] G11 at 9:30am. Patient is very appreciative.

## 2019-11-11 ENCOUNTER — OFFICE VISIT (OUTPATIENT)
Dept: SURGERY | Age: 44
End: 2019-11-11

## 2019-11-11 ENCOUNTER — HOSPITAL ENCOUNTER (OUTPATIENT)
Dept: MAMMOGRAPHY | Age: 44
Discharge: HOME | End: 2019-11-11
Attending: SURGERY
Payer: COMMERCIAL

## 2019-11-11 VITALS
WEIGHT: 154 LBS | HEIGHT: 63 IN | SYSTOLIC BLOOD PRESSURE: 121 MMHG | DIASTOLIC BLOOD PRESSURE: 70 MMHG | BODY MASS INDEX: 27.29 KG/M2 | HEART RATE: 78 BPM

## 2019-11-11 DIAGNOSIS — N63.20 LEFT BREAST MASS: Primary | ICD-10-CM

## 2019-11-11 DIAGNOSIS — N63.20 LEFT BREAST MASS: ICD-10-CM

## 2019-11-11 DIAGNOSIS — N64.4 MASTODYNIA: ICD-10-CM

## 2019-11-11 PROCEDURE — 76642 ULTRASOUND BREAST LIMITED: CPT

## 2019-11-11 PROCEDURE — 77062 BREAST TOMOSYNTHESIS BI: CPT

## 2019-11-11 NOTE — PROGRESS NOTES
HISTORY OF PRESENT ILLNESS  Zoltan Hardwick is a 38 y.o. female. HPI  ESTABLISHED patient here for new lumps to her LEFT breast.  She feels lumps to her LEFT breast incision. The area is painful, which comes and goes. 3/19/19 - LEFT breast excisional biopsy - Fibrocystic changes with nodular adenosis and radial scar     FH includes-  Mother diagnosed with lung cancer, . Maternal grandmother diagnosed with uterine cancer, . Paternal grandmother diagnosed with breast cancer, . Mammogram due  Review of Systems   All other systems reviewed and are negative. Physical Exam   Pulmonary/Chest: Right breast exhibits no inverted nipple, no mass, no nipple discharge, no skin change and no tenderness. Left breast exhibits mass (2 small masses soft mobile 9:00 2 cfn). Left breast exhibits no inverted nipple, no nipple discharge, no skin change and no tenderness. Breasts are symmetrical.       Lymphadenopathy:     She has no cervical adenopathy. She has no axillary adenopathy. Nursing note and vitals reviewed. ASSESSMENT and PLAN    ICD-10-CM ICD-9-CM    1. Left breast mass N63.20 611.72 TAMAR 3D SHERRY W MAMMO BI DX INCL CAD      US BREAST LT LIMITED=<3 QUAD   2. Mastodynia N64.4 611.71 US BREAST LT LIMITED=<3 QUAD       Patient went to [facility] after office visit. Mammo/us revealed the following:   Study Result     EXAM:  TAMAR 3D SHERRY W MAMMO BI DX INCL CAD, US BREAST LT LIMITED=<3 QUAD     INDICATION: Left breast lump. Family history of breast cancer in paternal  grandmother.     COMPARISON: Mammography 2018, 2015. MRI 2018.     TECHNIQUE: Diagnostic bilateral digital MLO and CC views. Left breast ML and  spot compression views are performed. 3-D/tomosynthesis views are obtained. Computer aided detection (CAD) was utilized.  Left breast ultrasound was  performed.     BREAST COMPOSITION: The breast tissue is heterogeneously dense, which could  obscure detection of small masses (approximately 51-75% glandular).     FINDINGS:   Mammography:  Skin markers are noted on the anterior left breast with no underlying  abnormality. There is no suspicious mass or architectural distortion in the  right or left breast.  There are no suspicious pleomorphic calcifications. No  skin thickening or nipple retraction.       Ultrasound:  Left breast ultrasound at the 9:00 position 2 cm from the nipple in the area of  palpable concern demonstrates a simple cyst measuring 9 x 6 x 9 mm. There is an  adjacent cyst measuring 11 x 5 x 10 mm with mural nodularity.     IMPRESSION  IMPRESSION:   1. Suspicious left breast cyst with mural nodularity measuring 11 x 5 x 10 mm. There is also an adjacent benign simple cyst.  2. No evidence for malignancy in the right breast.     Recommendation:  Ultrasound-guided biopsy of a complex left breast cyst.     BI-RADS Assessment Category 4: Suspicious abnormality- Biopsy should be  considered. - will set up biopsy  I notified patient of plan.

## 2019-11-11 NOTE — PROGRESS NOTES
HISTORY OF PRESENT ILLNESS Ashley Culver is a 38 y.o. female. HPI ESTABLISHED patient here for new LEFT breast lump and pain. Breast history- 
3/19/19 - LEFT breast excisional biopsy. Surg path benign, fibrocystic change and radial scar, No cancer. 
  
Family history- 
Mother diagnosed with lung cancer, . Maternal grandmother diagnosed with uterine cancer, . Paternal grandmother diagnosed with breast cancer, .  
  
Breast imaging-  
18 - breast MRI BI-RADS 2 
2018 - mammogram 
  She gets breast MRIs due to her high risk. Previously calculated tyrer-melba is 21.5% ROS Physical Exam 
 
ASSESSMENT and PLAN 
{ASSESSMENT/PLAN:09868}

## 2019-11-11 NOTE — PATIENT INSTRUCTIONS
Breast Lumps: Care Instructions  Your Care Instructions  Breast lumps are common, especially in women between ages 27 and 48. Many women's breasts feel lumpy and tender before their menstrual period. Women also may have lumps when they are breastfeeding. Breast lumps may go away after menopause. All new breast lumps in women after menopause should be checked by a doctor. Although lumps may be normal for you, it is important to have your doctor check any lump or thickness that is not like the rest of your breast to make sure it is not cancer. A lump may be larger, harder, or different from the rest of your breast tissue. Follow-up care is a key part of your treatment and safety. Be sure to make and go to all appointments, and call your doctor if you are having problems. It's also a good idea to know your test results and keep a list of the medicines you take. How can you care for yourself at home? · Make an appointment to have a mammogram and other follow-up visits as recommended by your doctor. When should you call for help? Watch closely for changes in your health, and be sure to contact your doctor if:    · You do not get better as expected.     · Your breast has changed.     · You have pain in your breast.     · You have a discharge from your nipple.     · A breast lump changes or does not go away. Where can you learn more? Go to http://luis-yadiel.info/. Enter P442 in the search box to learn more about \"Breast Lumps: Care Instructions. \"  Current as of: February 19, 2019  Content Version: 12.2  © 7542-8847 [employer]. Care instructions adapted under license by [employer] (which disclaims liability or warranty for this information). If you have questions about a medical condition or this instruction, always ask your healthcare professional. [address] any warranty or liability for your use of this information.

## 2019-11-11 NOTE — PROGRESS NOTES
Dr Kenneth Rios contacted Dr Kelli Forde and would like us to do the bx. She will contact the pt and I informed Ava Gandara of this and gave her the pt's paperwork.   valerie

## 2019-11-12 NOTE — TELEPHONE ENCOUNTER
Re: Botox    Pt called office after finishing enrollment process with [facility]. Per patient, [facility] told her she needed to make a copayment of $1500 before they could ship the medication. I called [facility] to clarify, and per the verifications dept, pt must pay 100% until her has met her $4000 deductible. I asked [facility] about financial assistance, and per [facility] patient may call back and inquire about the reimbursement center through [facility].     I called patient back to relay the above info. I also informed patient about Botox copay assist and told her I could email her information for the program.     Patient initially requested that we cancel her appt, but her  called back and said he will call [facility] back and have them ship the medication and ask that we keep pt appt for Friday.

## 2019-11-15 ENCOUNTER — OFFICE VISIT (OUTPATIENT)
Dept: NEUROLOGY | Age: 44
End: 2019-11-15

## 2019-11-15 VITALS
DIASTOLIC BLOOD PRESSURE: 86 MMHG | SYSTOLIC BLOOD PRESSURE: 138 MMHG | OXYGEN SATURATION: 98 % | BODY MASS INDEX: 28.34 KG/M2 | WEIGHT: 154 LBS | RESPIRATION RATE: 14 BRPM | HEIGHT: 62 IN | HEART RATE: 86 BPM

## 2019-11-15 DIAGNOSIS — G43.719 INTRACTABLE CHRONIC MIGRAINE WITHOUT AURA AND WITHOUT STATUS MIGRAINOSUS: Primary | ICD-10-CM

## 2019-11-15 NOTE — PROGRESS NOTES
Botox Injection Note     11/15/2019     Patient:  Tiara Sepulveda   YOB: 1975  Date of Visit: 11/15/2019      Indication: patient has chronic migraine headaches greater than 15 days per month lasting more than 4 hours each. She has failed or not tolerated 2 or more medication preventatives. Written consent was signed and verified by me. Risks and benefits were discussed to include possible cosmetic asymmetry which is not permament or life threatening. Patient name and  was confirmed prior to procedure. Time out performed. Procedure:   Botox concentration: 200 units in 2 ml of preservative-free normal saline. 1:1 dilution. LOT#: [id number]  EXP:  2022    Injections and distribution as follow :      Units/site  Sites Loc Subtotal    procerus 5 1 ML 5   corrugaters 2.5 2 BL 5   frontalis 5 8 BL 40   Temporalis 10 4 BL 40   Occipitalis 10 2 BL 20   Cervical paraspinals 5 4 BL 20   Trapezius 10 4 BL 40         200 units Botox were reconstituted, 170 units injected as above and the remainder was unavoidably wasted. Patient tolerated procedure well. Return in 3 months for repeat injections.     _____________________________   Oren Toledo, DO  [address], ABPN

## 2019-11-18 ENCOUNTER — HOSPITAL ENCOUNTER (OUTPATIENT)
Dept: MAMMOGRAPHY | Age: 44
Discharge: HOME | End: 2019-11-18
Attending: SURGERY
Payer: COMMERCIAL

## 2019-11-18 DIAGNOSIS — N63.20 LEFT BREAST MASS: ICD-10-CM

## 2019-11-18 DIAGNOSIS — N64.4 MASTODYNIA: ICD-10-CM

## 2019-11-18 PROCEDURE — 74011000250 HC RX REV CODE- 250: Performed by: RADIOLOGY

## 2019-11-18 PROCEDURE — 19083 BX BREAST 1ST LESION US IMAG: CPT

## 2019-11-18 PROCEDURE — 77065 DX MAMMO INCL CAD UNI: CPT

## 2019-11-18 PROCEDURE — 88305 TISSUE EXAM BY PATHOLOGIST: CPT

## 2019-11-18 RX ORDER — LIDOCAINE HYDROCHLORIDE AND EPINEPHRINE 10; 10 MG/ML; UG/ML
20 INJECTION, SOLUTION INFILTRATION; PERINEURAL ONCE
Status: COMPLETED | OUTPATIENT
Start: 2019-11-18 | End: 2019-11-18

## 2019-11-18 RX ORDER — LIDOCAINE HYDROCHLORIDE 10 MG/ML
5 INJECTION INFILTRATION; PERINEURAL
Status: COMPLETED | OUTPATIENT
Start: 2019-11-18 | End: 2019-11-18

## 2019-11-18 RX ADMIN — LIDOCAINE HYDROCHLORIDE AND EPINEPHRINE 200 MG: 10; 10 INJECTION, SOLUTION INFILTRATION; PERINEURAL at 11:10

## 2019-11-18 RX ADMIN — LIDOCAINE HYDROCHLORIDE 5 ML: 10 INJECTION, SOLUTION INFILTRATION; PERINEURAL at 11:10

## 2019-11-18 NOTE — PROGRESS NOTES
Patient tolerated left breast biopsy well with scant bleeding. Biopsy site was bandaged using steri strips and paper tape. Discharge instructions were reviewed with the patient. She was provided with a written copy as well. Advised patient that results should be available by Wednesday and that she will receive a phone call in the afternoon. Encouraged her to call with any questions or concerns.

## 2019-11-18 NOTE — DISCHARGE INSTRUCTIONS
Breast Biopsy Discharge Instructions    PAIN CONTROL     You may have mild discomfort; take 1-2 Tylenol every 4-6 hours as needed.  Do not take aspirin containing products or anti-inflammatory medications (Advil, Aleve, Motrin, Ibuprofen, etc.) for 24 hours.  Wearing a comfortable bra for support may help with discomfort. WOUND CARE      A small amount of bleeding or bruising at the biopsy site is normal. Watch for signs and symptoms of infections: skin warm to touch, yellowish drainage from wound, fever or severe pain.  Place an ice pack over the site hourly, 20-30 at a time for a few hours today.  The clear dressing is water resistant (you may shower, but do not allow the dressing to become saturated). You may remove the dressing in 48 hours. The steri-strips (small pieces of tape covering the incision) will fall off on their own in a few days. If the clear dressing irritates your skin or begins to peel off, you may remove it. Remember, if you remove the clear dressing before 48 hours, you should not get the site wet.  If at any point you have EXCESSIVE BLEEDING (saturating the gauze under the clear dressing) OR pain please call:    Daytime 7:30am-5:00pm: [facility] [phone]    After Hours:    [phone] (ask to speak to a radiologist)              or [address] [place] [address] [phone]    ACTIVITY     Do not participate in any strenuous activities for 24 hours (exercise, sports, housework, etc.).  You may resume work (light duty only) for the first 24 hours.  No heavy lifting with the arm on the affected side of your body. ADDITIONAL INSTRUCTIONS    We will call you with results on Wednesday November 20 in the afternoon.        YOUR TREATMENT TEAM TODAY WAS    MD: Joss Edwards  RN: Jennifer Rodriguez  Radiology Tech: Tai Cummins

## 2019-11-20 NOTE — PROGRESS NOTES
Pathology approved by Dr. Rodolfo Rodriguez. Called patient and relayed benign results. Advised patient that she should continue her annual mammogram schedule. She reports that her biopsy site is healing well with no concerns. Encouraged her to call with any question or concerns. 3

## 2020-01-30 ENCOUNTER — DOCUMENTATION ONLY (OUTPATIENT)
Dept: NEUROLOGY | Age: 45
End: 2020-01-30

## 2020-01-30 NOTE — PROGRESS NOTES
pts botox arrived in office today from [facility]    Lot #  [id number]   Exp  6/2022      Pts appt is 2/7/20

## 2020-02-07 ENCOUNTER — OFFICE VISIT (OUTPATIENT)
Dept: NEUROLOGY | Age: 45
End: 2020-02-07

## 2020-02-07 VITALS
SYSTOLIC BLOOD PRESSURE: 124 MMHG | WEIGHT: 155 LBS | RESPIRATION RATE: 18 BRPM | OXYGEN SATURATION: 99 % | BODY MASS INDEX: 28.52 KG/M2 | HEIGHT: 62 IN | DIASTOLIC BLOOD PRESSURE: 78 MMHG | HEART RATE: 90 BPM

## 2020-02-07 DIAGNOSIS — G43.719 INTRACTABLE CHRONIC MIGRAINE WITHOUT AURA AND WITHOUT STATUS MIGRAINOSUS: Primary | ICD-10-CM

## 2020-02-07 NOTE — PROGRESS NOTES
Botox Injection Note     2020     Patient:  Michaelle Franco   YOB: 1975  Date of Visit: 2020      Indication: patient has chronic migraine headaches greater than 15 days per month lasting more than 4 hours each. She has failed or not tolerated 2 or more medication preventatives. Written consent was signed and verified by me. Risks and benefits were discussed to include possible cosmetic asymmetry which is not permament or life threatening. Patient name and  was confirmed prior to procedure. Time out performed. Procedure:   Botox concentration: 200 units in 2 ml of preservative-free normal saline. 1:1 dilution. LOT#: [id number]  EXP:  2022    Injections and distribution as follow :      Units/site  Sites Loc Subtotal    procerus 5 1 ML 5   corrugaters 2.5 2 BL 5   frontalis 5 4 BL 20   Temporalis 10 4 BL 40   Occipitalis 10 2 BL 20   Cervical paraspinals 5 4 BL 20   Trapezius 10 4 BL 40         200 units Botox were reconstituted, 150 units injected as above and the remainder was unavoidably wasted. Patient tolerated procedure well. Return in 3 months for repeat injections.     _____________________________   Fredi Mcnally, DO  [address], ABPN

## 2020-04-06 ENCOUNTER — TELEPHONE (OUTPATIENT)
Dept: NEUROLOGY | Age: 45
End: 2020-04-06

## 2020-04-06 NOTE — TELEPHONE ENCOUNTER
Re: Botox approved    Approval rec'd from [address] via [employer]     [id number] [facility] [id number]---35/86/[id number]/88/99 ( 2 visits)     SS[facility] is [facility]--[phone]

## 2022-03-19 PROBLEM — N60.19 FIBROCYSTIC BREAST: Status: ACTIVE | Noted: 2018-10-10

## 2022-03-19 PROBLEM — Z80.3 FAMILY HISTORY OF BREAST CANCER: Status: ACTIVE | Noted: 2018-10-10

## 2022-11-03 ENCOUNTER — OFFICE VISIT (OUTPATIENT)
Dept: CARDIOLOGY CLINIC | Age: 47
End: 2022-11-03
Payer: COMMERCIAL

## 2022-11-03 VITALS
RESPIRATION RATE: 16 BRPM | OXYGEN SATURATION: 97 % | HEIGHT: 62 IN | SYSTOLIC BLOOD PRESSURE: 100 MMHG | DIASTOLIC BLOOD PRESSURE: 60 MMHG | BODY MASS INDEX: 28.16 KG/M2 | WEIGHT: 153 LBS | HEART RATE: 70 BPM

## 2022-11-03 DIAGNOSIS — R00.2 HEART PALPITATIONS: Primary | ICD-10-CM

## 2022-11-03 PROCEDURE — 99204 OFFICE O/P NEW MOD 45 MIN: CPT | Performed by: SPECIALIST

## 2022-11-03 RX ORDER — OMEGA-3-ACID ETHYL ESTERS 1 G/1
2 CAPSULE, LIQUID FILLED ORAL 2 TIMES DAILY
Qty: 120 CAPSULE | Refills: 11 | Status: SHIPPED | OUTPATIENT
Start: 2022-11-03

## 2022-11-03 NOTE — PROGRESS NOTES
HISTORY OF PRESENT ILLNESS  Doug Clark is a 49 y.o. female. She is seen initially for palpitations. She has had palpitations on and off for most of her life but they have been more significant for the past 2 to 3 months. She saw another cardiologist who did a monitor for 2 weeks as well as an echo and found nothing abnormal.  She has an apple watch and thinks her heart rate can suddenly be up to 210 bpm.  She has had some anemia and is now taking supplementation with vitamin B12 injections and also folic acid. Her palpitations can last 7 hours. She has been restricting salt intake. Both of her parents have atrial fibrillation. She works out 5 nights a week he does not smoke cigarettes or drink alcohol and has only 1 cup of coffee per day. HPI  Patient Active Problem List   Diagnosis Code    Chronic cholecystitis K81.1    Hypothyroidism E03.9    Family history of breast cancer Z80.3    Fibrocystic breast N60.19     Current Outpatient Medications   Medication Sig Dispense Refill    omega-3 acid ethyl esters (Lovaza) 1 gram capsule Take 2 Capsules by mouth two (2) times a day. 120 Capsule 11    LORazepam (ATIVAN) 0.5 mg tablet Take  by mouth. sertraline (ZOLOFT) 25 mg tablet Take 25 mg by mouth daily. levothyroxine (SYNTHROID) 50 mcg tablet Take 50 mcg by mouth Daily (before breakfast).       onabotulinumtoxinA (BOTOX) 200 unit injection Inject selected muscles head and neck bilaterally every 12 weeks  Indications: Migraine Prevention (Patient not taking: Reported on 11/3/2022) 200 Units 3     Past Medical History:   Diagnosis Date    Headache     Lupus ([address]     Musculoskeletal disorder     Thyroid disease      Past Surgical History:   Procedure Laterality Date    HX BREAST LUMPECTOMY Left 3/19/2019    LEFT BREAST EXCISION BIOPSY WITH ULTRASOUND performed by Forde Osgood, MD at [facility] 11    HX HEENT      jaw surgery    HX LAP CHOLECYSTECTOMY  2/24/15    by Dr. Pat Quintana BREAST LT 1ST LESION W/CLIP AND SPECIMEN Left 2015    benign       Review of Systems   Cardiovascular:  Positive for palpitations. All other systems reviewed and are negative. Visit Vitals  /60   Pulse 70   Resp 16   Ht 5' 2\" (1.575 m)   Wt 153 lb (69.4 kg)   SpO2 97%   BMI 27.98 kg/m²       Physical Exam  Vitals and nursing note reviewed. Constitutional:       Appearance: Normal appearance. HENT:      Head: Normocephalic. Right Ear: External ear normal.      Left Ear: External ear normal.      Nose: Nose normal.      Mouth/Throat:      Mouth: Mucous membranes are moist.   Eyes:      Extraocular Movements: Extraocular movements intact. Cardiovascular:      Rate and Rhythm: Normal rate and regular rhythm. Heart sounds: Normal heart sounds. Pulmonary:      Effort: Pulmonary effort is normal.   Abdominal:      Palpations: Abdomen is soft. Musculoskeletal:         General: Normal range of motion. Cervical back: Normal range of motion. Skin:     General: Skin is warm. Neurological:      Mental Status: She is alert and oriented to person, place, and time. Psychiatric:         Behavior: Behavior normal.       ASSESSMENT and PLAN  It is unclear if she is having episodes of supraventricular tachycardia or if there is artifact causing these high readings. I suggested that she obtain the On her phone that will allow her to send strips through [employer] when her heart rate is high using her apple watch. Since her blood pressure runs low I have also suggested that she liberalize her salt intake and have given her prescription for fish oil prescription variety to take 2 capsules twice a day in the hope of helping with the palpitations. We will call her regarding follow-up once more data is available.

## 2022-11-09 ENCOUNTER — TELEPHONE (OUTPATIENT)
Dept: CARDIOLOGY CLINIC | Age: 47
End: 2022-11-09

## 2022-11-09 NOTE — TELEPHONE ENCOUNTER
Dr. Sharla Davey-   Patient left ekg printouts at the . They are on your desk  review. Please advise. Thanks. No future appointments.

## 2022-11-10 ENCOUNTER — TELEPHONE (OUTPATIENT)
Dept: CARDIOLOGY CLINIC | Age: 47
End: 2022-11-10

## 2022-11-10 NOTE — TELEPHONE ENCOUNTER
----- Message from Kimberly Rogers RN sent at 11/10/2022  1:42 PM EST -----  Regarding: FW: ekgs    ----- Message -----  From: Doug Clark  Sent: 11/10/2022   1:25 PM EST  To: Patricia Back  Subject: ekgs                                             Yes. Daily. The palpitations and high heart rate is hard to deal with on a daily basis. I do get light headed at times. The palpitations can be really strong at times like it was this morning. I woke up with a heart rate of 108. Its hard to live like this daily.

## 2022-11-10 NOTE — TELEPHONE ENCOUNTER
MD Catherine Motta RN  Caller: Unspecified (Yesterday,  5:01 PM)  Reviewed.   Just fast normal rhythm, sinus tach

## 2022-11-10 NOTE — TELEPHONE ENCOUNTER
I told pt we received her ekgs, you reviewed and said \"Just fast normal rhythm, sinus tach. \" She asked if there was anything she could take to help. I said it was not dangerous, but asked if any sxs. She replied the following message. Please advise. Thanks.

## 2022-12-01 ENCOUNTER — OFFICE VISIT (OUTPATIENT)
Dept: CARDIOLOGY CLINIC | Age: 47
End: 2022-12-01
Payer: COMMERCIAL

## 2022-12-01 VITALS
HEIGHT: 62 IN | BODY MASS INDEX: 27.53 KG/M2 | DIASTOLIC BLOOD PRESSURE: 82 MMHG | WEIGHT: 149.6 LBS | HEART RATE: 84 BPM | RESPIRATION RATE: 18 BRPM | SYSTOLIC BLOOD PRESSURE: 116 MMHG | OXYGEN SATURATION: 99 %

## 2022-12-01 DIAGNOSIS — R00.2 HEART PALPITATIONS: Primary | ICD-10-CM

## 2022-12-01 PROBLEM — Z98.890 S/P LUMPECTOMY OF BREAST: Status: ACTIVE | Noted: 2022-12-01

## 2022-12-01 RX ORDER — METOPROLOL SUCCINATE 25 MG/1
25 TABLET, EXTENDED RELEASE ORAL DAILY
Qty: 30 TABLET | Refills: 11 | Status: SHIPPED | OUTPATIENT
Start: 2022-12-01

## 2022-12-01 NOTE — PROGRESS NOTES
HISTORY OF PRESENT ILLNESS  Yordy Castellanos is a 49 y.o. female. She is seen in follow-up for palpitations. Her blood pressure was low in the past and I tried her on fish oil and it seemed to help somewhat but gave her unacceptable side effects. Her palpitations can occur all day long. A monitor showed only sinus tachycardia but she may be having some premature ventricular contractions as well. She has a history of lumpectomy in the left breast 5 years ago. Her blood pressure is higher in the office today although still well within the range of normal so she may be able to tolerate a very low-dose of the beta-blocker. She is premenopausal and having a lot of symptoms in this regard and thinks it may be occurring in conjunction with her palpitations. HPI  Patient Active Problem List   Diagnosis Code    Chronic cholecystitis K81.1    Hypothyroidism E03.9    Family history of breast cancer Z80.3    Fibrocystic breast N60.19    S/P lumpectomy of breast Z98.890     Current Outpatient Medications   Medication Sig Dispense Refill    metoprolol succinate (TOPROL-XL) 25 mg XL tablet Take 1 Tablet by mouth daily. 30 Tablet 11    LORazepam (ATIVAN) 0.5 mg tablet Take  by mouth. sertraline (ZOLOFT) 25 mg tablet Take 25 mg by mouth daily. levothyroxine (SYNTHROID) 50 mcg tablet Take 50 mcg by mouth Daily (before breakfast). omega-3 acid ethyl esters (Lovaza) 1 gram capsule Take 2 Capsules by mouth two (2) times a day.  (Patient not taking: Reported on 12/1/2022) 120 Capsule 11    onabotulinumtoxinA (BOTOX) 200 unit injection Inject selected muscles head and neck bilaterally every 12 weeks  Indications: Migraine Prevention (Patient not taking: Reported on 12/1/2022) 200 Units 3     Past Medical History:   Diagnosis Date    Headache     Lupus ([address]     Musculoskeletal disorder     Thyroid disease      Past Surgical History:   Procedure Laterality Date    HX BREAST LUMPECTOMY Left 3/19/2019    LEFT BREAST EXCISION BIOPSY WITH ULTRASOUND performed by Roxana Salcedo MD at [facility] 11    HX HEENT      jaw surgery    HX LAP CHOLECYSTECTOMY  2/24/15    by Dr. Trang Ritchie Left 2015    benign       Review of Systems   Cardiovascular:  Positive for palpitations. All other systems reviewed and are negative. Visit Vitals  /82 (BP 1 Location: Left upper arm, BP Patient Position: Sitting, BP Cuff Size: Adult)   Pulse 84   Resp 18   Ht 5' 2\" (1.575 m)   Wt 149 lb 9.6 oz (67.9 kg)   SpO2 99%   BMI 27.36 kg/m²       Physical Exam  Vitals and nursing note reviewed. Constitutional:       Appearance: Normal appearance. HENT:      Head: Normocephalic. Right Ear: External ear normal.      Left Ear: External ear normal.      Nose: Nose normal.      Mouth/Throat:      Mouth: Mucous membranes are moist.   Eyes:      Extraocular Movements: Extraocular movements intact. Cardiovascular:      Rate and Rhythm: Normal rate and regular rhythm. Heart sounds: Normal heart sounds. Pulmonary:      Breath sounds: Normal breath sounds. Abdominal:      Palpations: Abdomen is soft. Musculoskeletal:         General: Normal range of motion. Cervical back: Normal range of motion. Skin:     General: Skin is warm. Neurological:      Mental Status: She is alert and oriented to person, place, and time. Psychiatric:         Behavior: Behavior normal.       ASSESSMENT and PLAN  I believe that she might benefit from a very low-dose of a beta-blocker. I will prescribe Toprol-XL 25 mg a day but I told her to start with just a half a pill and let us know how she is doing with regards her symptoms and her blood pressure. I will see her back as needed.

## 2023-11-20 ENCOUNTER — OFFICE VISIT (OUTPATIENT)
Age: 48
End: 2023-11-20
Payer: COMMERCIAL

## 2023-11-20 VITALS — HEIGHT: 62 IN | BODY MASS INDEX: 26.13 KG/M2 | WEIGHT: 142 LBS

## 2023-11-20 DIAGNOSIS — N63.21 MASS OF UPPER OUTER QUADRANT OF LEFT BREAST: Primary | ICD-10-CM

## 2023-11-20 PROCEDURE — 99213 OFFICE O/P EST LOW 20 MIN: CPT | Performed by: SURGERY

## 2023-11-20 NOTE — PROGRESS NOTES
HISTORY OF PRESENT ILLNESS  Montserrat Chen is a 51 y.o. female     HPI ESTABLISHED patient here for LEFT breast lump. She first felt the lump two weeks ago. She reports that the whole breast is tender to the touch and itchy. She denies nipple changes and skin changes. 3/19/19 - LEFT breast excisional biopsy Dr. Lucinda Mariano- Fibrocystic changes with nodular adenosis and radial scar      FH includes-   Mother diagnosed with lung, . Maternal grandmother diagnosed with uterine cancer, . Paternal grandmother diagnosed with breast cancer, . Review of Systems   All other systems reviewed and are negative. Physical Exam  Vitals and nursing note reviewed. Chest:   Breasts:     Right: No swelling, bleeding, inverted nipple, mass, nipple discharge, skin change or tenderness. Left: Mass (2 cm mass 12:00) present. No swelling, bleeding, inverted nipple, nipple discharge, skin change or tenderness. Lymphadenopathy:      Upper Body:      Right upper body: No axillary adenopathy. Left upper body: No axillary adenopathy. ASSESSMENT and PLAN   Diagnosis Orders   1. Mass of upper outer quadrant of left breast  PORTIA ALANIS DIGITAL DIAGNOSTIC BILATERAL    US BREAST LIMITED LEFT        - patient is overdue mammograms  Will schedule 3 d bilateral dx mammo and us  Plan depends on imaging   20 minutes was spent with patient on counseling and coordination of care.

## 2023-11-21 ENCOUNTER — HOSPITAL ENCOUNTER (OUTPATIENT)
Facility: HOSPITAL | Age: 48
Discharge: HOME | End: 2023-11-24
Attending: SURGERY
Payer: COMMERCIAL

## 2023-11-21 VITALS — BODY MASS INDEX: 25.95 KG/M2 | HEIGHT: 62 IN | WEIGHT: 141 LBS

## 2023-11-21 DIAGNOSIS — N63.21 MASS OF UPPER OUTER QUADRANT OF LEFT BREAST: ICD-10-CM

## 2023-11-21 PROCEDURE — G0279 TOMOSYNTHESIS, MAMMO: HCPCS

## 2023-11-21 PROCEDURE — 76642 ULTRASOUND BREAST LIMITED: CPT

## 2023-11-24 NOTE — PROGRESS NOTES
Date of Visit: 11/28/2023  Patient Name: Rusty Manjarrez  YOB: 1975  Referring Provider: Annmarie Lees MD      HISTORY OF PRESENT ILLNESS  uRsty Manjarrez is a 51 y.o. female with no personal cancer history referred by Dr. Claudeen Oram for genetic counseling due to her family history of cancer. We met to review this history and determine whether genetic testing for hereditary susceptibility to disease may be indicated for the patient. The appointment was conducted in-person and Ms. Mcfadden was unaccompanied to the appointment. Medical History  Gynecologic History: Onset of menses at age 7y. 1 child. First completed pregnancy at age 27y. OCPs used for <1 year. Surgery (SERGE/BSO/USO)    Cancer Screening History:  Breast cancer screening  LEFT breast biopsy scheduled for Monday, 12/4/2023.  11/21/2023 - Bilateral digital diagnostic mammogram and limited ultrasound examination of the left breast: BI-RADS 4: Suspicious. Palpable left breast lump corresponds to a complex cyst on ultrasound; further evaluation with ultrasound-guided cyst aspiration/core needle biopsy is recommended. 11/20/2023 - Consult with Dr. Claudeen Oram for left breast lump: 2cm mass 12:00 on physical exam  03/19/2019 - LEFT breast excisional biopsy performed by Dr. Claudeen Oram: Fibrocystic changes with nodular adenosis and radial scar   Gynecological cancer screening  UTD. No history of abnormal Pap smear  Colon cancer screening  Uncertain of when last colonoscopy took place, but reported not due yet. 2 polyps on prior colonoscopy. Skin cancer screening    Social History  Smoking status: Current non-smoker for the last 15 years. Previous tobacco use. Alcohol intake: None    Family History  A three-generation pedigree was collected at the time of the appointment based on patient report in the absence of complete medical records. A full pedigree is available in the electronic medical record for additional details.   No consanguinity reported

## 2023-11-28 ENCOUNTER — OFFICE VISIT (OUTPATIENT)
Age: 48
End: 2023-11-28

## 2023-11-28 DIAGNOSIS — Z80.0 FAMILY HISTORY OF PANCREATIC CANCER: Primary | ICD-10-CM

## 2023-11-28 DIAGNOSIS — Z80.3 FAMILY HISTORY OF BREAST CANCER: ICD-10-CM

## 2023-12-04 ENCOUNTER — HOSPITAL ENCOUNTER (OUTPATIENT)
Facility: HOSPITAL | Age: 48
Discharge: HOME | End: 2023-12-07
Attending: SURGERY
Payer: COMMERCIAL

## 2023-12-04 DIAGNOSIS — R92.8 ABNORMAL MAMMOGRAM OF LEFT BREAST: ICD-10-CM

## 2023-12-04 PROCEDURE — 2500000003 HC RX 250 WO HCPCS: Performed by: RADIOLOGY

## 2023-12-04 PROCEDURE — 2720000010 US BREAST BIOPSY W LOC DEVICE 1ST LESION LEFT

## 2023-12-04 PROCEDURE — 88305 TISSUE EXAM BY PATHOLOGIST: CPT

## 2023-12-04 RX ORDER — LIDOCAINE HYDROCHLORIDE AND EPINEPHRINE 10; 10 MG/ML; UG/ML
10 INJECTION, SOLUTION INFILTRATION; PERINEURAL ONCE
Status: COMPLETED | OUTPATIENT
Start: 2023-12-04 | End: 2023-12-04

## 2023-12-04 RX ADMIN — LIDOCAINE HYDROCHLORIDE 5 ML: 10 INJECTION, SOLUTION INFILTRATION; PERINEURAL at 11:15

## 2023-12-04 RX ADMIN — LIDOCAINE HYDROCHLORIDE,EPINEPHRINE BITARTRATE 10 ML: 10; .01 INJECTION, SOLUTION INFILTRATION; PERINEURAL at 11:15

## 2023-12-08 NOTE — PROGRESS NOTES
Pathology approved by Dr. Edelmira Mccracken. Called patient and relayed benign results. Advised patient that she should continue her annual mammogram schedule. She reports that her biopsy site is healing well, she did have some very significant bruising, but overall no concerns. Encouraged her to call with any question or concerns.

## 2023-12-11 ENCOUNTER — TELEPHONE (OUTPATIENT)
Age: 48
End: 2023-12-11

## 2023-12-11 NOTE — TELEPHONE ENCOUNTER
Left voicemail requesting call back at [phone] to discuss germline genetic test results. Did not disclose results via voicemail.

## 2023-12-11 NOTE — TELEPHONE ENCOUNTER
on a case-by-case basis for cancer risk assessment and tailored screening recommendations. RECOMMENDATION & PLAN:  The patient should continue to follow the cancer screening guidelines outlined by her physicians based on personal and family history. The patient is welcome to contact us with any questions regarding the interpretation of her genetic test results. The patient was given time to ask questions and all questions were answered to the best of my ability.     Lloyd Zacarias MS, CGC

## 2024-10-17 ENCOUNTER — OFFICE VISIT (OUTPATIENT)
Age: 49
End: 2024-10-17

## 2024-10-17 VITALS
RESPIRATION RATE: 18 BRPM | BODY MASS INDEX: 25.03 KG/M2 | OXYGEN SATURATION: 97 % | WEIGHT: 136 LBS | HEIGHT: 62 IN | SYSTOLIC BLOOD PRESSURE: 136 MMHG | DIASTOLIC BLOOD PRESSURE: 88 MMHG | HEART RATE: 76 BPM | TEMPERATURE: 98.7 F

## 2024-10-17 DIAGNOSIS — L03.317 CELLULITIS OF BUTTOCK: Primary | ICD-10-CM

## 2024-10-17 DIAGNOSIS — Z86.19 HISTORY OF CANDIDIASIS OF VAGINA: ICD-10-CM

## 2024-10-17 DIAGNOSIS — R59.0 INGUINAL ADENOPATHY: ICD-10-CM

## 2024-10-17 PROBLEM — R00.0 SINUS TACHYCARDIA: Status: ACTIVE | Noted: 2023-11-19

## 2024-10-17 PROBLEM — E55.9 VITAMIN D DEFICIENCY: Status: ACTIVE | Noted: 2024-10-17

## 2024-10-17 PROBLEM — F32.A DEPRESSIVE DISORDER: Status: ACTIVE | Noted: 2024-10-17

## 2024-10-17 PROBLEM — M79.7 PRIMARY FIBROMYALGIA SYNDROME: Status: ACTIVE | Noted: 2018-04-27

## 2024-10-17 PROBLEM — F41.9 ANXIETY: Status: ACTIVE | Noted: 2018-04-27

## 2024-10-17 RX ORDER — FLUCONAZOLE 150 MG/1
150 TABLET ORAL ONCE
Qty: 1 TABLET | Refills: 0 | Status: SHIPPED | OUTPATIENT
Start: 2024-10-17 | End: 2024-10-17

## 2024-10-17 RX ORDER — DOXYCYCLINE HYCLATE 100 MG
100 TABLET ORAL 2 TIMES DAILY
Qty: 14 TABLET | Refills: 0 | Status: SHIPPED | OUTPATIENT
Start: 2024-10-17 | End: 2024-10-24

## 2024-10-17 RX ORDER — TRIAMCINOLONE ACETONIDE 1 MG/G
CREAM TOPICAL 2 TIMES DAILY
Qty: 15 G | Refills: 0 | Status: SHIPPED | OUTPATIENT
Start: 2024-10-17

## 2024-10-17 RX ORDER — CYANOCOBALAMIN, ISOPROPYL ALCOHOL 1000MCG/ML
KIT INJECTION
COMMUNITY

## 2024-10-17 ASSESSMENT — VISUAL ACUITY: OU: 1

## 2024-10-17 ASSESSMENT — ENCOUNTER SYMPTOMS
RESPIRATORY NEGATIVE: 1
EYES NEGATIVE: 1
ABDOMINAL PAIN: 0
GASTROINTESTINAL NEGATIVE: 1
BACK PAIN: 0
ALLERGIC/IMMUNOLOGIC NEGATIVE: 1

## 2024-10-17 NOTE — PATIENT INSTRUCTIONS
Thank you for visiting [facility] today.    Take oral antibiotics on a full stomach until you complete the entire prescription.   Take a probiotic while you are using the antibiotic.    Elevate the area - elevating the arm or leg above the level of the heart can help to reduce swelling and speed healing.  Keep the area clean and dry - it is important to keep the infected area clean and dry.   You can shower or bathe normally and pat the area dry with a clean towel.  You can use a bandage or gauze to protect the skin if needed.  You may use warm, moist compresses for pain relief.  Antibiotics - Most people with cellulitis are treated with an antibiotic that is taken by mouth for 5 to 14 days.   It is important to take the antibiotic exactly as recommended and to finish the entire course of treatment.  Skipping doses or ending treatment early could potentially allow the bacteria to become resistant and require longer treatment or permit the infection to worse after initial improvement.  Time to heal - Resolution of fever and chills, if they were initially present, should occur within one to two days after starting antibiotic therapy.  Local findings of swelling, warmth, and redness should begin to improve within one to three days after starting antibiotics, although these symptoms can persist for two weeks.  If the reddened area becomes larger, more swollen, or more tender, call your healthcare provider.  He or she may want to re-examine you to determine if further testing or an alternate antibiotic is needed.      Please follow up with your PCP within 2 days if your signs and symptoms have not resolved or worsened.    Please go immediately to the ED if you develop fever of 100.4F or above, chills, vomiting, worsening redness/pain/swelling to affected area, red streaks, and/or no improvement after 48 hours of oral antibiotic therapy.

## 2024-10-17 NOTE — PROGRESS NOTES
also understands that early in the process of an illness, an urgent care workup can be falsely reassuring.  Routine discharge counseling and specific return precautions discussed with patient and the patient understands that worsening, changing or persistent symptoms should prompt an immediate return to the urgent care or emergency department.  Patient/Guardian expressed understanding and agrees with the discharge plan.  No further questions at time of discharge.    Makayla Nix, APRN - CNP

## 2024-10-18 ASSESSMENT — ENCOUNTER SYMPTOMS
NAUSEA: 0
VOMITING: 0

## 2025-06-08 NOTE — PROGRESS NOTES
Patient tolerated left breast biopsy well with scant bleeding. Biopsy site was bandaged in the usual fashion and discharge instructions were reviewed with the patient. She was provided with a written copy as well. Advised patient that results should be available in 2-3 business days and that she will receive a phone call. Encouraged her to call with any questions or concerns. Yes

## (undated) DEVICE — REM POLYHESIVE ADULT PATIENT RETURN ELECTRODE: Brand: VALLEYLAB

## (undated) DEVICE — INFECTION CONTROL KIT SYS

## (undated) DEVICE — SYR 10ML LUER LOK 1/5ML GRAD --

## (undated) DEVICE — NEEDLE HYPO 25GA L1.5IN BVL ORIENTED ECLIPSE

## (undated) DEVICE — SUTURE MCRYL SZ 4-0 L27IN ABSRB UD L19MM PS-2 1/2 CIR PRIM Y426H

## (undated) DEVICE — SOLUTION IV 1000ML 0.9% SOD CHL

## (undated) DEVICE — SURGICAL PROCEDURE PACK BASIN MAJ SET CUST NO CAUT

## (undated) DEVICE — DRAPE,LAPAROTOMY,T,PEDI,STERILE: Brand: MEDLINE

## (undated) DEVICE — KENDALL SCD EXPRESS SLEEVES, KNEE LENGTH, MEDIUM: Brand: KENDALL SCD

## (undated) DEVICE — SUTURE VCRL SZ 3-0 L27IN ABSRB UD L26MM SH 1/2 CIR J416H

## (undated) DEVICE — INSULATED BLADE ELECTRODE: Brand: EDGE

## (undated) DEVICE — DRAPE PRB US TRNSDCR 6X96IN --

## (undated) DEVICE — HANDLE LT SNAP ON ULT DURABLE LENS FOR TRUMPF ALC DISPOSABLE

## (undated) DEVICE — (D)PREP SKN CHLRAPRP APPL 26ML -- CONVERT TO ITEM 371833

## (undated) DEVICE — STERILE POLYISOPRENE POWDER-FREE SURGICAL GLOVES WITH EMOLLIENT COATING: Brand: PROTEXIS

## (undated) DEVICE — SUT SLK 2-0SH 30IN BLK --

## (undated) DEVICE — Device

## (undated) DEVICE — APPLICATOR BNDG 1MM ADH PREMIERPRO EXOFIN

## (undated) DEVICE — STERILE NEOPRENE POWDER-FREE SURGICAL GLOVES WITH NITRILE COATING: Brand: PROTEXIS